# Patient Record
Sex: FEMALE | Race: WHITE | NOT HISPANIC OR LATINO | Employment: FULL TIME | URBAN - METROPOLITAN AREA
[De-identification: names, ages, dates, MRNs, and addresses within clinical notes are randomized per-mention and may not be internally consistent; named-entity substitution may affect disease eponyms.]

---

## 2017-07-25 ENCOUNTER — GENERIC CONVERSION - ENCOUNTER (OUTPATIENT)
Dept: OTHER | Facility: OTHER | Age: 62
End: 2017-07-25

## 2017-07-25 DIAGNOSIS — Z12.11 ENCOUNTER FOR SCREENING FOR MALIGNANT NEOPLASM OF COLON: ICD-10-CM

## 2017-07-25 DIAGNOSIS — I07.1 RHEUMATIC TRICUSPID INSUFFICIENCY: ICD-10-CM

## 2017-07-25 DIAGNOSIS — I34.0 NONRHEUMATIC MITRAL VALVE INSUFFICIENCY: ICD-10-CM

## 2017-07-25 DIAGNOSIS — R73.01 IMPAIRED FASTING GLUCOSE: ICD-10-CM

## 2017-07-25 DIAGNOSIS — E03.8 OTHER SPECIFIED HYPOTHYROIDISM: ICD-10-CM

## 2017-07-25 DIAGNOSIS — Z00.00 ENCOUNTER FOR GENERAL ADULT MEDICAL EXAMINATION WITHOUT ABNORMAL FINDINGS: ICD-10-CM

## 2017-07-25 DIAGNOSIS — Z92.89 PERSONAL HISTORY OF OTHER MEDICAL TREATMENT: ICD-10-CM

## 2017-07-25 DIAGNOSIS — I37.1 NONRHEUMATIC PULMONARY VALVE INSUFFICIENCY: ICD-10-CM

## 2017-07-25 DIAGNOSIS — E55.9 VITAMIN D DEFICIENCY: ICD-10-CM

## 2017-07-25 DIAGNOSIS — E66.3 OVERWEIGHT(278.02): ICD-10-CM

## 2017-10-21 ENCOUNTER — LAB CONVERSION - ENCOUNTER (OUTPATIENT)
Dept: OTHER | Facility: OTHER | Age: 62
End: 2017-10-21

## 2017-10-21 LAB
A/G RATIO (HISTORICAL): 1.5 (CALC) (ref 1–2.5)
ALBUMIN SERPL BCP-MCNC: 4 G/DL (ref 3.6–5.1)
ALP SERPL-CCNC: 76 U/L (ref 33–130)
ALT SERPL W P-5'-P-CCNC: 16 U/L (ref 6–29)
AST SERPL W P-5'-P-CCNC: 22 U/L (ref 10–35)
BASOPHILS # BLD AUTO: 1.5 %
BASOPHILS # BLD AUTO: 60 CELLS/UL (ref 0–200)
BILIRUB SERPL-MCNC: 0.5 MG/DL (ref 0.2–1.2)
BILIRUB UR QL STRIP: NEGATIVE
BUN SERPL-MCNC: 11 MG/DL (ref 7–25)
BUN/CREA RATIO (HISTORICAL): NORMAL (CALC) (ref 6–22)
CALCIUM SERPL-MCNC: 9.3 MG/DL (ref 8.6–10.4)
CHLORIDE SERPL-SCNC: 105 MMOL/L (ref 98–110)
CHOLEST SERPL-MCNC: 170 MG/DL
CHOLEST/HDLC SERPL: 2.5 (CALC)
CO2 SERPL-SCNC: 27 MMOL/L (ref 20–31)
COLOR UR: YELLOW
COMMENT (HISTORICAL): CLEAR
CREAT SERPL-MCNC: 0.63 MG/DL (ref 0.5–0.99)
DEPRECATED RDW RBC AUTO: 14.3 % (ref 11–15)
EGFR AFRICAN AMERICAN (HISTORICAL): 111 ML/MIN/1.73M2
EGFR-AMERICAN CALC (HISTORICAL): 95 ML/MIN/1.73M2
EOSINOPHIL # BLD AUTO: 108 CELLS/UL (ref 15–500)
EOSINOPHIL # BLD AUTO: 2.7 %
FECAL OCCULT BLOOD DIAGNOSTIC (HISTORICAL): NEGATIVE
GAMMA GLOBULIN (HISTORICAL): 2.7 G/DL (CALC) (ref 1.9–3.7)
GLUCOSE (HISTORICAL): 93 MG/DL (ref 65–99)
GLUCOSE (HISTORICAL): NEGATIVE
HCT VFR BLD AUTO: 35.9 % (ref 35–45)
HDLC SERPL-MCNC: 67 MG/DL
HGB BLD-MCNC: 11.6 G/DL (ref 11.7–15.5)
KETONES UR STRIP-MCNC: NEGATIVE MG/DL
LDL CHOLESTEROL (HISTORICAL): 89 MG/DL (CALC)
LEUKOCYTE ESTERASE UR QL STRIP: NEGATIVE
LYMPHOCYTES # BLD AUTO: 1424 CELLS/UL (ref 850–3900)
LYMPHOCYTES # BLD AUTO: 35.6 %
MCH RBC QN AUTO: 28.2 PG (ref 27–33)
MCHC RBC AUTO-ENTMCNC: 32.3 G/DL (ref 32–36)
MCV RBC AUTO: 87.1 FL (ref 80–100)
MONOCYTES # BLD AUTO: 456 CELLS/UL (ref 200–950)
MONOCYTES (HISTORICAL): 11.4 %
NEUTROPHILS # BLD AUTO: 1952 CELLS/UL (ref 1500–7800)
NEUTROPHILS # BLD AUTO: 48.8 %
NITRITE UR QL STRIP: NEGATIVE
NON-HDL-CHOL (CHOL-HDL) (HISTORICAL): 103 MG/DL (CALC)
PH UR STRIP.AUTO: 7.5 [PH] (ref 5–8)
PLATELET # BLD AUTO: 261 THOUSAND/UL (ref 140–400)
PMV BLD AUTO: 12.4 FL (ref 7.5–12.5)
POTASSIUM SERPL-SCNC: 4.3 MMOL/L (ref 3.5–5.3)
PROT UR STRIP-MCNC: NEGATIVE MG/DL
RBC # BLD AUTO: 4.12 MILLION/UL (ref 3.8–5.1)
SODIUM SERPL-SCNC: 139 MMOL/L (ref 135–146)
SP GR UR STRIP.AUTO: 1.01 (ref 1–1.03)
TOTAL PROTEIN (HISTORICAL): 6.7 G/DL (ref 6.1–8.1)
TRIGL SERPL-MCNC: 56 MG/DL
WBC # BLD AUTO: 4 THOUSAND/UL (ref 3.8–10.8)

## 2017-10-31 ENCOUNTER — LAB CONVERSION - ENCOUNTER (OUTPATIENT)
Dept: OTHER | Facility: OTHER | Age: 62
End: 2017-10-31

## 2017-11-06 ENCOUNTER — ALLSCRIPTS OFFICE VISIT (OUTPATIENT)
Dept: OTHER | Facility: OTHER | Age: 62
End: 2017-11-06

## 2017-11-14 ENCOUNTER — TRANSCRIBE ORDERS (OUTPATIENT)
Dept: ADMINISTRATIVE | Age: 62
End: 2017-11-14

## 2017-11-14 ENCOUNTER — HOSPITAL ENCOUNTER (OUTPATIENT)
Dept: RADIOLOGY | Age: 62
Discharge: HOME | End: 2017-11-14
Payer: COMMERCIAL

## 2017-11-14 ENCOUNTER — GENERIC CONVERSION - ENCOUNTER (OUTPATIENT)
Dept: OTHER | Facility: OTHER | Age: 62
End: 2017-11-14

## 2017-11-14 DIAGNOSIS — Z12.31 ENCOUNTER FOR SCREENING MAMMOGRAM FOR MALIGNANT NEOPLASM OF BREAST: ICD-10-CM

## 2017-11-14 PROCEDURE — G0202 SCR MAMMO BI INCL CAD: HCPCS

## 2017-12-28 ENCOUNTER — GENERIC CONVERSION - ENCOUNTER (OUTPATIENT)
Dept: INTERNAL MEDICINE CLINIC | Facility: CLINIC | Age: 62
End: 2017-12-28

## 2018-01-13 NOTE — PROGRESS NOTES
Assessment    1  Overweight (278 02) (E66 3)   2  RBBB (right bundle branch block) (426 4) (I45 10)   3  Abnormal fasting glucose (790 29) (R73 01)   4  Mild tricuspid insufficiency (397 0) (I07 1)   5  Nonrheumatic pulmonary valve insufficiency (424 3) (I37 1)   6  Non-rheumatic mitral regurgitation (424 0) (I34 0)    Plan  Abnormal fasting glucose, Colon cancer screening, Health Maintenance, Mild tricuspid  insufficiency, Overweight, RBBB (right bundle branch block)    · (1) OCCULT BLOOD, FECAL IMMUNOCHEMICAL TEST; Status:Active; Requested  for:[id number]; Abnormal fasting glucose, Health Maintenance, Mild tricuspid insufficiency, Overweight,  RBBB (right bundle branch block)    · (1) CBC/PLT/DIFF; Status:Active; Requested for:37Zvq0803;    · (1) COMPREHENSIVE METABOLIC PANEL; Status:Active; Requested for:75Kkp4575;    · (1) LIPID PANEL FASTING W DIRECT LDL REFLEX; Status:Active; Requested  for:70Rcg2944;   Adult onset hypothyroidism, Colon cancer screening    · (1) TSH; Status:Active; Requested for:60Ggv2446;   Adult onset hypothyroidism, Low vitamin D level    · (1) VITAMIN D 25-HYDROXY; Status:Active; Requested for:35Ugv1337;   Colon cancer screening    · (1) URINALYSIS (will reflex a microscopy if leukocytes, occult blood, protein or nitrites  are not within normal limits); Status:Active; Requested for:15Zdf6289; Health Maintenance, History of screening mammography    · MAMMO DIAGNOSTIC BILATERAL W CAD; Status:Hold For - Scheduling; Requested  for:59Rwz5852;   RBBB (right bundle branch block)    · ECHO STRESS TEST W CONTRAST IF INDICATED; Status:Need Information -  Financial Authorization; Requested for:[id number]; Check comprehensive blood work including vitamin D and thyroid screening  Rest echocardiogram in view of right bundle branch block  annual mammogram  Dermatology consultation for evaluation of scalp lesion  Annual pelvic examination with GYN       Discussion/Summary  In summary this 54-year-old patient presents today for annual physical examination  Since her last visit with us she has gained approximately 8 pounds  Reviewed the need to adjust her diet and increase exercise in attempt to bring her body mass index into a normal range  She has a history of a right bundle branch block along with a mitral tricuspid and pulmonic valve insufficiency by previous echocardiogram  I'm recommending that the patient have a stress echocardiogram in view of her family history of CAD  Patient's blood work is pending at this time review it with the patient when it becomes available  She has a history of mild glucose intolerance and in view of her weight gain is possible that this number may have increased  We will review the results of testing with the patient when it becomes available  I anticipate a annual physical examination appointment in one year  Chief Complaint  This is an annual complete physical examination this 54-year-old female patient  History of Present Illness  HPI: Since annual complete physical examination for this 54-year-old female patient  She presents today having gained several pounds since her last visit with us  She also is concerned about a small scalp lesion which she detected recently  She denies any active cardiac symptoms  She has a known history of glucose intolerance overweight condition right bundle branch block and mitral valve tricuspid valve and pulmonic valve regurgitation  Review of Systems    Constitutional: No fever, no chills, feels well, no tiredness, no recent weight gain or weight loss  Eyes: No complaints of eye pain, no red eyes, no eyesight problems, no discharge, no dry eyes, no itching of eyes  ENT: no complaints of earache, no loss of hearing, no nose bleeds, no nasal discharge, no sore throat, no hoarseness     Cardiovascular: No complaints of slow heart rate, no fast heart rate, no chest pain, no palpitations, no leg claudication, no lower extremity edema  Respiratory: No complaints of shortness of breath, no wheezing, no cough, no SOB on exertion, no orthopnea, no PND  Gastrointestinal: No complaints of abdominal pain, no constipation, no nausea or vomiting, no diarrhea, no bloody stools  Genitourinary: No complaints of dysuria, no incontinence, no pelvic pain, no dysmenorrhea, no vaginal discharge or bleeding  Musculoskeletal: No complaints of arthralgias, no myalgias, no joint swelling or stiffness, no limb pain or swelling  Integumentary: skin lesion and Recently discovered pigmented skin lesion on the top of the patient's head no discharge  Neurological: No complaints of headache, no confusion, no convulsions, no numbness, no dizziness or fainting, no tingling, no limb weakness, no difficulty walking  Psychiatric: Not suicidal, no sleep disturbance, no anxiety or depression, no change in personality, no emotional problems  Endocrine: No complaints of proptosis, no hot flashes, no muscle weakness, no deepening of the voice, no feelings of weakness  Hematologic/Lymphatic: No complaints of swollen glands, no swollen glands in the neck, does not bleed easily, does not bruise easily  Active Problems    1  Abnormal fasting glucose (790 29) (R73 01)   2  Adult onset hypothyroidism (244 8) (E03 8)   3  Colon cancer screening (V76 51) (Z12 11)   4  History of screening mammography (V15 89) (Z92 89)   5  Low vitamin D level (268 9) (E55 9)   6  Mild tricuspid insufficiency (397 0) (I07 1)   7  Overweight (278 02) (E66 3)   8  RBBB (right bundle branch block) (426 4) (I45 10)    Past Medical History    · History of scoliosis (V13 59) (Z87 39)    Surgical History    · History of Hysterectomy    Social History    · Caffeine use (V49 89) (F15 90)   · Exercise frequency (times/week)   · Never a smoker    Allergies    1  Demerol TABS   2   Percocet TABS    3  Seasonal    Vitals   Recorded: 21Pgd1733 09:32AM Recorded: 00Gbr0858 08:58AM Systolic 680 478   Diastolic 74 86   Heart Rate 72 80   Respiration  16   Temperature  97 8 F   O2 Saturation  96   Height  5 ft 7 in   Weight  228 lb 2 08 oz   BMI Calculated  35 73   BSA Calculated  2 14     Physical Exam    Constitutional   General appearance: No acute distress, well appearing and well nourished  Head and Face   Head and face: Normal     Eyes   Conjunctiva and lids: No swelling, erythema or discharge  Pupils and irises: Equal, round, reactive to light  Ophthalmoscopic examination: Normal fundi and optic discs  Ears, Nose, Mouth, and Throat   External inspection of ears and nose: Normal     Otoscopic examination: Tympanic membranes translucent with normal light reflex  Canals patent without erythema  Nasal mucosa, septum, and turbinates: Normal without edema or erythema  Lips, teeth, and gums: Normal, good dentition  Oropharynx: Normal with no erythema, edema, exudate or lesions  Neck   Neck: Supple, symmetric, trachea midline, no masses  Thyroid: Normal, no thyromegaly  Pulmonary   Respiratory effort: No increased work of breathing or signs of respiratory distress  Percussion of chest: Normal     Auscultation of lungs: Clear to auscultation  Cardiovascular   Auscultation of heart: Normal rate and rhythm, normal S1 and S2, no murmurs  Carotid pulses: 2+ bilaterally  Pedal pulses: 2+ bilaterally  Examination of extremities for edema and/or varicosities: Normal     Abdomen   Abdomen: Non-tender, no masses  Liver and spleen: No hepatomegaly or splenomegaly  Lymphatic   Palpation of lymph nodes in neck: No lymphadenopathy  Musculoskeletal   Gait and station: Normal     Digits and nails: Normal without clubbing or cyanosis      Joints, bones, and muscles: Normal     Range of motion: Normal     Stability: Normal     Muscle strength/tone: Normal     Skin   Skin and subcutaneous tissue: Abnormal   3 mm scalp lesion which is darkly pigmented and slightly scaled  Neurologic   Cranial nerves: Cranial nerves II-XII intact  Cortical function: Normal mental status  Reflexes: 2+ and symmetric  Coordination: Normal finger to nose and heel to shin  Psychiatric   Judgment and insight: Normal     Orientation to person, place, and time: Normal     Recent and remote memory: Intact  Mood and affect: Normal        Future Appointments    Date/Time Provider Specialty Site   07/28/2017 09:00 AM WILLIAM Whitten   Internal Medicine [facility] INTERNAL MED     Signatures   Electronically signed by : WILLIAM Hall ; Jul 25 2016 10:17AM EST                       (Author)

## 2018-01-15 NOTE — PROGRESS NOTES
Assessment    1  Non-rheumatic mitral regurgitation (424 0) (I34 0)   2  Nonrheumatic pulmonary valve insufficiency (424 3) (I37 1)   3  RBBB (right bundle branch block) (426 4) (I45 10)   4  Obesity (BMI 30-39 9) (278 00) (E66 9)   5  Vertigo (780 4) (R42)   6  Peripheral edema (782 3) (R60 9)   7  Mild tricuspid insufficiency (397 0) (I07 1)   8  Mild anemia (285 9) (D64 9)    Plan  Health Maintenance    · EKG/ECG- POC; Status:Complete;   Done: 38QLM8490 08:33AM    1  Non medic mitral valve regurgitation nonrheumatic pulmonary valve insufficiency and mild tricuspid insufficiency no change in murmur intensity recommend continued clinical observation  2   Right bundle branch block no change in like her cardiogram asymptomatic will continue to monitor  3   Obesity patient's we continues to be a significant risk for her future health have recommended that she initiate regular aerobic exercise either biking walking or swimming in addition she needs to decrease her daily calorie intake into the range of  calories a day  We had a discussion about the importance of losing weight  4   Of for vertigo most likely due to the station tube congestion improved with decongestant suspect it may have been either allergy or possibly a mild viral syndrome  At this time her nose and ear examination is completely normal no further interventions necessary at this time  5   Of very mild peripheral edema at the end of the day not present on today's examination but by history with the patient  Recommend that she be careful with salt consumption elevate her feet during the day  6   Very mild anemia with normal indices and negative Hemoccult       Discussion/Summary    In summary performed a annual physical examination on this patient today and reviewed her blood work as well as electrocardiogram  She did have a brief episode of vertigo which is resolved appears that it was most likely either congestion of the a station tube due to allergies or viral syndrome  She is strongly advised to have a baseline colonoscopy she was referred to Dr Julito Munoz for a colonoscopy  We discussed the benefits of a baseline colonoscopy in the early detection of colon cancer  She is due for an annual mammogram at this time  Of the patient has a history of very mild anemia on this set of blood work with normal indices and heme- negative stool will continue to monitor her blood count in 1 year  The anemia is very mild and asymptomatic  Patient has very slight insufficiency of 3 valves which is asymptomatic murmur has not changed I will follow this clinically at present time ago believe she needs a echocardiogram this year  Will see the patient in 1 year for her next checkup sooner if she has any medical problems  total time of encounter was 45 minutes and 35 minutes was spent counseling  Chief Complaint  patient is here for a yearly physical       History of Present Illness  HM, Adult Female: The patient is being seen for a health maintenance evaluation  The last health maintenance visit was 1 year(s) ago  Social History: Household members include spouse  She is   The patient has never smoked cigarettes  She reports rare alcohol use  She has never used illicit drugs  General Health: The patient's health since the last visit is described as good  She has regular dental visits  She denies vision problems  She denies hearing loss  Immunizations status: not up to date   Patient declines flu shot  Lifestyle:  She consumes a diverse and healthy diet  She has weight concerns  She does not exercise regularly  She does not use tobacco  She denies alcohol use  She denies drug use  Screening:   HPI: This 74-year-old female patient presents today for an annual health maintenance examination along with a electrocardiogram and blood work   She has a history recently of a period of dizziness and lightheadedness of all associated with a left-sided ear congestion  This lasted for several hours  She denies any chest pain palpitations or shortness of breath associated with the episode  She indicates that she did not have any nausea or vomiting associated with the episode either  Her symptoms did improve after taking an over-the-counter decongestant  She also has occasional and p m  foot swelling which decreases and resolves overnight      Review of Systems    Constitutional: No fever, no chills, feels well, no tiredness, no recent weight gain or weight loss  Eyes: No complaints of eye pain, no red eyes, no eyesight problems, no discharge, no dry eyes, no itching of eyes  ENT: no complaints of earache, no loss of hearing, no nose bleeds, no nasal discharge, no sore throat, no hoarseness  Cardiovascular: lower extremity edema  Respiratory: No complaints of shortness of breath, no wheezing, no cough, no SOB on exertion, no orthopnea, no PND  Gastrointestinal: No complaints of abdominal pain, no constipation, no nausea or vomiting, no diarrhea, no bloody stools  Genitourinary: No complaints of dysuria, no incontinence, no pelvic pain, no dysmenorrhea, no vaginal discharge or bleeding  Musculoskeletal: No complaints of arthralgias, no myalgias, no joint swelling or stiffness, no limb pain or swelling  Integumentary: No complaints of skin rash or lesions, no itching, no skin wounds, no breast pain or lump  Neurological: No complaints of headache, no confusion, no convulsions, no numbness, no dizziness or fainting, no tingling, no limb weakness, no difficulty walking  Psychiatric: Not suicidal, no sleep disturbance, no anxiety or depression, no change in personality, no emotional problems  Endocrine: No complaints of proptosis, no hot flashes, no muscle weakness, no deepening of the voice, no feelings of weakness     Hematologic/Lymphatic: No complaints of swollen glands, no swollen glands in the neck, does not bleed easily, does not bruise easily  Active Problems    1  Colon cancer screening (V76 51) (Z12 11)   2  History of screening mammography (V15 89) (Z92 89)   3  Mild tricuspid insufficiency (397 0) (I07 1)   4  Non-rheumatic mitral regurgitation (424 0) (I34 0)   5  Nonrheumatic pulmonary valve insufficiency (424 3) (I37 1)   6  Overweight (278 02) (E66 3)   7  RBBB (right bundle branch block) (426 4) (I45 10)   8  Visit for screening mammogram (V76 12) (Z12 31)    Past Medical History    · History of Abnormal fasting glucose (790 29) (R73 01)   · History of scoliosis (V13 59) (Z87 39)    Surgical History    · History of Hysterectomy    Family History  Mother    · No pertinent family history    Social History    · Caffeine use (V49 89) (F15 90)   · Exercise frequency (times/week)   · Never a smoker    Current Meds   1  No Reported Medications Recorded    Allergies    1  Demerol TABS   2  Percocet TABS    3  Seasonal    Vitals   Recorded: 33WWX5884 08:27AM   Temperature 97 F   Heart Rate 78   Respiration 14   Systolic 894   Diastolic 76   Height 5 ft 7 in   Weight 227 lb 0 2 oz   BMI Calculated 35 56   BSA Calculated 2 13   O2 Saturation 98     Physical Exam    Constitutional   General appearance: No acute distress, well appearing and well nourished  Head and Face   Head and face: Normal     Eyes   Conjunctiva and lids: No swelling, erythema or discharge  Pupils and irises: Equal, round, reactive to light  Ophthalmoscopic examination: Normal fundi and optic discs  Ears, Nose, Mouth, and Throat   External inspection of ears and nose: Normal     Otoscopic examination: Tympanic membranes translucent with normal light reflex  Canals patent without erythema  Nasal mucosa, septum, and turbinates: Normal without edema or erythema  Lips, teeth, and gums: Normal, good dentition  Oropharynx: Normal with no erythema, edema, exudate or lesions  Neck   Neck: Supple, symmetric, trachea midline, no masses  Thyroid: Normal, no thyromegaly  Pulmonary   Respiratory effort: No increased work of breathing or signs of respiratory distress  Percussion of chest: Normal     Auscultation of lungs: Clear to auscultation  Cardiovascular   Auscultation of heart: Abnormal   1/6 systolic murmur  Carotid pulses: 2+ bilaterally  Abdominal aorta: Normal     Pedal pulses: 2+ bilaterally  Peripheral vascular exam: Normal     Examination of extremities for edema and/or varicosities: Normal     Abdomen   Abdomen: Non-tender, no masses  Liver and spleen: No hepatomegaly or splenomegaly  Lymphatic   Palpation of lymph nodes in neck: No lymphadenopathy  Musculoskeletal   Gait and station: Normal     Digits and nails: Normal without clubbing or cyanosis  Joints, bones, and muscles: Normal     Range of motion: Normal     Stability: Normal     Muscle strength/tone: Normal     Skin   Skin and subcutaneous tissue: Normal without rashes or lesions  Palpation of skin and subcutaneous tissue: Normal turgor  Neurologic   Cranial nerves: Cranial nerves II-XII intact  Cortical function: Normal mental status  Reflexes: 2+ and symmetric  Coordination: Normal finger to nose and heel to shin  Psychiatric   Judgment and insight: Normal     Orientation to person, place, and time: Normal     Recent and remote memory: Intact  Mood and affect: Normal        Results/Data  EKG/ECG- POC [id number] 08:33AM Monse Avilez     Test Name Result Flag Reference   EKG/ECG 11/06/2017       PHQ-2 Adult Depression Screening [id number] 08:32AM User, Ahs     Test Name Result Flag Reference   PHQ-2 Adult Depression Score 0     Over the last two weeks, how often have you been bothered by any of the following problems?   Little interest or pleasure in doing things: Not at all - 0  Feeling down, depressed, or hopeless: Not at all - 0   PHQ-2 Adult Depression Screening Negative       Falls Risk Assessment (Dx Z13 89 Screen for Neurologic Disorder) [id number] 08:32AM User, Ahs     Test Name Result Flag Reference   Falls Risk      No falls in the past year       Future Appointments    Date/Time Provider Specialty Site   11/08/2018 09:15 AM WILLIAM [place]   Internal Medicine [facility] INTERNAL MED     Signatures   Electronically signed by : WILLIAM Gerardo ; Nov 6 2017 12:33PM EST                       (Author)

## 2018-01-22 VITALS
DIASTOLIC BLOOD PRESSURE: 76 MMHG | HEIGHT: 67 IN | HEART RATE: 78 BPM | OXYGEN SATURATION: 98 % | RESPIRATION RATE: 14 BRPM | SYSTOLIC BLOOD PRESSURE: 122 MMHG | WEIGHT: 227.01 LBS | BODY MASS INDEX: 35.63 KG/M2 | TEMPERATURE: 97 F

## 2018-11-02 ENCOUNTER — IMMUNIZATION (OUTPATIENT)
Dept: INTERNAL MEDICINE CLINIC | Facility: CLINIC | Age: 63
End: 2018-11-02
Payer: COMMERCIAL

## 2018-11-02 DIAGNOSIS — Z23 NEED FOR INFLUENZA VACCINATION: Primary | ICD-10-CM

## 2018-11-02 PROCEDURE — 90471 IMMUNIZATION ADMIN: CPT

## 2018-11-02 PROCEDURE — 90682 RIV4 VACC RECOMBINANT DNA IM: CPT

## 2018-12-07 ENCOUNTER — CLINICAL SUPPORT (OUTPATIENT)
Dept: INTERNAL MEDICINE CLINIC | Facility: CLINIC | Age: 63
End: 2018-12-07
Payer: COMMERCIAL

## 2018-12-07 DIAGNOSIS — Z23 NEED FOR TETANUS, DIPHTHERIA, AND ACELLULAR PERTUSSIS (TDAP) VACCINE: Primary | ICD-10-CM

## 2018-12-07 PROCEDURE — 90715 TDAP VACCINE 7 YRS/> IM: CPT

## 2018-12-07 PROCEDURE — 90471 IMMUNIZATION ADMIN: CPT

## 2019-01-21 ENCOUNTER — TELEPHONE (OUTPATIENT)
Dept: INTERNAL MEDICINE CLINIC | Facility: CLINIC | Age: 64
End: 2019-01-21

## 2019-01-21 DIAGNOSIS — Z12.11 COLON CANCER SCREENING: Primary | ICD-10-CM

## 2019-01-21 DIAGNOSIS — R53.83 OTHER FATIGUE: ICD-10-CM

## 2019-01-21 DIAGNOSIS — N39.0 URINARY TRACT INFECTION WITHOUT HEMATURIA, SITE UNSPECIFIED: ICD-10-CM

## 2019-01-21 DIAGNOSIS — E78.5 HYPERLIPIDEMIA, UNSPECIFIED HYPERLIPIDEMIA TYPE: ICD-10-CM

## 2019-01-21 PROBLEM — E66.9 OBESITY (BMI 30-39.9): Status: ACTIVE | Noted: 2017-11-06

## 2019-01-21 NOTE — TELEPHONE ENCOUNTER
Patient called and reschedule appointment to February   Patient asked for blood work to be mailed to her to be done for this appointment

## 2019-03-01 ENCOUNTER — OFFICE VISIT (OUTPATIENT)
Dept: INTERNAL MEDICINE CLINIC | Facility: CLINIC | Age: 64
End: 2019-03-01
Payer: COMMERCIAL

## 2019-03-01 VITALS
HEIGHT: 67 IN | DIASTOLIC BLOOD PRESSURE: 74 MMHG | HEART RATE: 78 BPM | TEMPERATURE: 97.3 F | WEIGHT: 233.8 LBS | BODY MASS INDEX: 36.7 KG/M2 | RESPIRATION RATE: 14 BRPM | OXYGEN SATURATION: 99 % | SYSTOLIC BLOOD PRESSURE: 132 MMHG

## 2019-03-01 DIAGNOSIS — I34.0 NON-RHEUMATIC MITRAL REGURGITATION: ICD-10-CM

## 2019-03-01 DIAGNOSIS — Z12.39 BREAST CANCER SCREENING: ICD-10-CM

## 2019-03-01 DIAGNOSIS — E66.9 OBESITY (BMI 30-39.9): ICD-10-CM

## 2019-03-01 DIAGNOSIS — I07.1 MILD TRICUSPID INSUFFICIENCY: ICD-10-CM

## 2019-03-01 DIAGNOSIS — I37.1 NONRHEUMATIC PULMONARY VALVE INSUFFICIENCY: ICD-10-CM

## 2019-03-01 DIAGNOSIS — Z00.00 HEALTHCARE MAINTENANCE: Primary | ICD-10-CM

## 2019-03-01 DIAGNOSIS — I45.10 RBBB (RIGHT BUNDLE BRANCH BLOCK): ICD-10-CM

## 2019-03-01 PROCEDURE — 93000 ELECTROCARDIOGRAM COMPLETE: CPT | Performed by: INTERNAL MEDICINE

## 2019-03-01 PROCEDURE — 99396 PREV VISIT EST AGE 40-64: CPT | Performed by: INTERNAL MEDICINE

## 2019-03-01 RX ORDER — BIOTIN 1 MG
TABLET ORAL
COMMUNITY
End: 2019-09-27 | Stop reason: ALTCHOICE

## 2019-03-01 RX ORDER — LANOLIN ALCOHOL/MO/W.PET/CERES
1 CREAM (GRAM) TOPICAL 3 TIMES DAILY
COMMUNITY

## 2019-03-01 RX ORDER — RIBOFLAVIN (VITAMIN B2) 100 MG
100 TABLET ORAL DAILY
COMMUNITY

## 2019-03-01 NOTE — PROGRESS NOTES
Assessment/Plan:    Mild tricuspid insufficiency  History of mild tricuspid insufficiency asymptomatic at this time no intervention is necessary consider follow-up echocardiogram in the next 1-2 years  Non-rheumatic mitral regurgitation  Mitral valve regurgitation asymptomatic at this time no intervention necessary recommend follow-up echocardiogram in 1-2 years  Nonrheumatic pulmonary valve insufficiency  Asymptomatic pulmonary valve insufficiency asymptomatic at this time no intervention necessary recommend follow-up echocardiogram in 1-2 years  RBBB (right bundle branch block)  Persistent right bundle branch block asymptomatic recommend follow-up EKG in 1 year    Obesity (BMI 30-39  9)  Obesity with body mass index of 36 62 recommend continued efforts to lose weight  Discussed joining weight watchers as well as regular walking exercise  The benefits of weight reduction were reviewed in detail with the patient today  Diagnoses and all orders for this visit:    Healthcare maintenance  -     POCT ECG    Breast cancer screening  -     Mammo screening bilateral w cad; Future    Mild tricuspid insufficiency    Non-rheumatic mitral regurgitation    Nonrheumatic pulmonary valve insufficiency    RBBB (right bundle branch block)    Obesity (BMI 30-39  9)    Other orders  -     Cholecalciferol (VITAMIN D3) 1000 units CAPS; Vitamin D3  -     glucosamine-chondroitin 500-400 MG tablet; Take 1 tablet by mouth 3 (three) times a day  -     Ascorbic Acid (VITAMIN C) 100 MG tablet; Take 100 mg by mouth daily        Subjective:      Patient ID: Steven Platt is a 58 y o  female  This pleasant 58-year-old female patient presents today for an annual health maintenance examination and electrocardiogram and recommendations for ongoing health maintenance  The patient is present today with no new complaints  She has a history of mild valvular disease and obesity along with a right bundle branch block        The following portions of the patient's history were reviewed and updated as appropriate:   She  has a past medical history of Abnormal fasting glucose and Scoliosis  She   Patient Active Problem List    Diagnosis Date Noted    Obesity (BMI 30-39 9) 11/06/2017    Mild tricuspid insufficiency 07/25/2016    Non-rheumatic mitral regurgitation 07/25/2016    Nonrheumatic pulmonary valve insufficiency 07/25/2016    RBBB (right bundle branch block) 07/25/2016     She  has a past surgical history that includes Hysterectomy  Her family history includes No Known Problems in her mother  She  reports that she has never smoked  She has never used smokeless tobacco  Her alcohol and drug histories are not on file  Current Outpatient Medications   Medication Sig Dispense Refill    Ascorbic Acid (VITAMIN C) 100 MG tablet Take 100 mg by mouth daily      Cholecalciferol (VITAMIN D3) 1000 units CAPS Vitamin D3      glucosamine-chondroitin 500-400 MG tablet Take 1 tablet by mouth 3 (three) times a day       No current facility-administered medications for this visit       Review of Systems   All other systems reviewed and are negative  Objective:      /74   Pulse 78   Temp (!) 97 3 °F (36 3 °C)   Resp 14   Ht 5' 7" (1 702 m)   Wt 106 kg (233 lb 12 8 oz)   SpO2 99%   BMI 36 62 kg/m²          Physical Exam   Constitutional: She is oriented to person, place, and time  Vital signs are normal  She appears well-developed and well-nourished  She is cooperative  No distress  HENT:   Right Ear: Hearing, tympanic membrane, external ear and ear canal normal    Left Ear: Hearing, tympanic membrane, external ear and ear canal normal    Nose: Nose normal  No mucosal edema  Mouth/Throat: Uvula is midline, oropharynx is clear and moist and mucous membranes are normal    Eyes: Pupils are equal, round, and reactive to light  Conjunctivae and lids are normal  Right eye exhibits no discharge   Left eye exhibits no discharge  Neck: No JVD present  Carotid bruit is not present  No thyromegaly present  Cardiovascular: Normal rate, regular rhythm and intact distal pulses  Murmur heard  Grade 1/6 systolic murmur   Pulmonary/Chest: Effort normal and breath sounds normal  No stridor  No respiratory distress  She has no wheezes  She has no rales  She exhibits no tenderness  Abdominal: Soft  Normal appearance and bowel sounds are normal  She exhibits no distension and no mass  There is no tenderness  There is no rebound and no guarding  No hernia  Musculoskeletal: Normal range of motion  She exhibits no edema  Lymphadenopathy:     She has no cervical adenopathy  Neurological: She is alert and oriented to person, place, and time  She has normal reflexes  She displays normal reflexes  Skin: Skin is warm, dry and intact  She is not diaphoretic  Psychiatric: She has a normal mood and affect  Her speech is normal and behavior is normal  Judgment and thought content normal  Cognition and memory are normal    Vitals reviewed

## 2019-03-01 NOTE — ASSESSMENT & PLAN NOTE
Mitral valve regurgitation asymptomatic at this time no intervention necessary recommend follow-up echocardiogram in 1-2 years

## 2019-03-01 NOTE — ASSESSMENT & PLAN NOTE
Asymptomatic pulmonary valve insufficiency asymptomatic at this time no intervention necessary recommend follow-up echocardiogram in 1-2 years

## 2019-03-01 NOTE — ASSESSMENT & PLAN NOTE
Obesity with body mass index of 36 62 recommend continued efforts to lose weight  Discussed joining weight watchers as well as regular walking exercise  The benefits of weight reduction were reviewed in detail with the patient today

## 2019-03-01 NOTE — ASSESSMENT & PLAN NOTE
History of mild tricuspid insufficiency asymptomatic at this time no intervention is necessary consider follow-up echocardiogram in the next 1-2 years

## 2019-08-15 ENCOUNTER — TRANSCRIBE ORDERS (OUTPATIENT)
Dept: PHYSICAL THERAPY | Age: 64
End: 2019-08-15

## 2019-08-15 ENCOUNTER — EVALUATION (OUTPATIENT)
Dept: PHYSICAL THERAPY | Age: 64
End: 2019-08-15
Payer: MEDICARE

## 2019-08-15 DIAGNOSIS — M25.561 CHRONIC PAIN OF RIGHT KNEE: Primary | ICD-10-CM

## 2019-08-15 DIAGNOSIS — M25.561 RIGHT KNEE PAIN, UNSPECIFIED CHRONICITY: Primary | ICD-10-CM

## 2019-08-15 DIAGNOSIS — G89.29 CHRONIC PAIN OF RIGHT KNEE: Primary | ICD-10-CM

## 2019-08-15 PROCEDURE — 97161 PT EVAL LOW COMPLEX 20 MIN: CPT

## 2019-08-15 NOTE — LETTER
August 15, 2019    Shasta Rice MD  [facility] [address] [facility]    Patient: Sury Vargas   YOB: 1955   Date of Visit: 8/15/2019     Encounter Diagnosis     ICD-10-CM    1  Chronic pain of right knee M25 561     G89 29        Dear Dr Virginia Tucker: Thank you for your recent referral of Sury Vargas  Please review the attached evaluation summary from Sarah Moise's recent visit  Please verify that you agree with the plan of care by signing the attached order  If you have any questions or concerns, please do not hesitate to call  I sincerely appreciate the opportunity to share in the care of one of your patients and hope to have another opportunity to work with you in the near future  Sincerely,    Nolan Palomo, PT      Referring Provider:      I certify that I have read the below Plan of Care and certify the need for these services furnished under this plan of treatment while under my care  Shasta Rice MD  [facility]: [phone]          PT Evaluation     Today's date: 8/15/2019  Patient name: Sury Vargas  : [birth year]  MRN: [id number]  Referring provider: Manfred Rawls MD  Dx:   Encounter Diagnosis     ICD-10-CM    1  Chronic pain of right knee M25 561     G89 29                   Assessment  Assessment details: Patient is a 71 y o  Female reporting to PT with complaints of R knee pain  She presents with decreased quad strength, impaired PF strength, and pain with performance of weight bearing ADL's  Patient denies N/T or radicular sx's  No red flags present  Overall, patient's presentation is consistent with OA of the R knee  At this time, patient would benefit from skilled PT to address the impairments listed    Impairments: abnormal or restricted ROM, abnormal movement, activity intolerance, impaired balance, impaired physical strength, lacks appropriate home exercise program, pain with function and poor body mechanics    Symptom irritability: moderateBarriers to therapy: None  Understanding of Dx/Px/POC: good   Prognosis: good    Goals  STG's: 4 Weeks  1 ) Patient will be independent with HEP   2 ) Patient will tolerate performance of light ADL's without discomfort  3 ) Patient will tolerate walking 4-5 city blocks without discomfort  4 ) Patient will exhibit at least 4/5 RLE strength in all planes  LTG's: 8 Weeks  1 ) Patient will exhibit 5/5 RLE strength in all planes  2 ) Patient will be able to ascend/descend stairs using reciprocal pattern without difficulty  3 ) Patient will be able to return to golfing 2x/wk without difficulty or discomfort  4 ) Patient will exceed predicted FOTO score  Plan  Plan details: Patient was educated regarding the etiology and pathomechanics of their condition  They demonstrated understanding verbally  Patient was provided with an HEP  They were educated regarding repetitions, resistance, and proper technique  Patient demonstrated understanding verbally  Patient would benefit from: skilled physical therapy  Planned therapy interventions: manual therapy, therapeutic exercise, therapeutic activities, stretching, strengthening, home exercise program, flexibility, functional ROM exercises, balance and neuromuscular re-education  Frequency: 2x week  Duration in weeks: 8  Treatment plan discussed with: patient        Subjective Evaluation    History of Present Illness  Mechanism of injury: Patient is a 71 y o  Female reporting to PT with complaints of R knee pain  Onset of sx's several weeks ago following a round of golf  She was swinging awkwardly while swinging from a sand trap and began to experience some moderate discomfort  Sx's persisted for the next few days and she was seen by her orthopedist  Arminda Malagon and were ordered and a CSI was performed on 8/12  She was then referred to PT  X-rays show OA with bone spurring  Sx's include dull/achey pain  Patient denies N/T, but did experience referred pain into the lateral aspect of the lower leg  Swelling was initially present, but has since resolved  Patient reports significant improvement in sx's following CSI  Initial calf tightness, but sx's have resolved  Functional Limitations  Walking: significant discomfort with prolonged walking; unable to walk >1/2 mile  Standing: no longer any discomfort with prolonged standing since the shot; unable to prior to the shit  Sleeping: improved ability to sleep through the night since the CSI; continued discomfort  Stairs: improved since the injection, but still some moderate discomfort   Household ADL's: able to perform, but with moderate discomfort  Recreational activities: patient has continued to golf without significant difficulty    Patient had a L TKA ~20 years ago  R Knee ACL reconstruction ~13 years ago    Patient consented to manual therapy and physical examination  Demonstrated understanding verbally             Not a recurrent problem   Quality of life: good    Pain  Current pain ratin  At best pain ratin  At worst pain rating: 3  Location: R Knee  Quality: dull ache, pressure, tight, throbbing and pulling  Relieving factors: relaxation, rest, support, ice and medications  Aggravating factors: running, walking and stair climbing      Diagnostic Tests  X-ray: abnormal  Treatments  Previous treatment: injection treatment  Patient Goals  Patient goals for therapy: decreased pain, increased motion and increased strength          Objective     Palpation     Additional Palpation Details  Point tenderness noted with palpation of medial joint line, popliteal fossa, and medial portion of calf muscle     Neurological Testing     Sensation     Knee   Left Knee   Intact: light touch    Right Knee   Intact: light touch     Reflexes   Left   Patellar (L4): normal (2+)  Achilles (S1): normal (2+)    Right   Patellar (L4): normal (2+)  Achilles (S1): normal (2+)    Active Range of Motion   Left Knee   Flexion: 110 degrees   Extension: 0 degrees     Right Knee   Flexion: 125 degrees   Extension: 0 degrees     Additional Active Range of Motion Details  SLR: 90 / 90 degrees     Strength/Myotome Testing     Left Hip   Planes of Motion   Flexion: 4+  Extension: 4-  Abduction: 4    Right Hip   Planes of Motion   Flexion: 4+  Extension: 4-  Abduction: 4    Left Knee   Flexion: 4+  Extension: 5    Right Knee   Flexion: 4  Extension: 4+    Left Ankle/Foot   Dorsiflexion: 5  Plantar flexion: 4    Right Ankle/Foot   Dorsiflexion: 5  Plantar flexion: 3+    General Comments:      Knee Comments  SLS: 4 / 3 seconds             Precautions: None      Manual              R Calf Stretch             R Quad Stretch                                                        Exercise Diary  8/15  IE            Bike             Standing Gastroc Stretch             Standing Soleus Stretch             Prone Quad Stretch             Bridges             SLR x 3             TB Clamshells             SAQ             HR             Standing H' Abd/Ext             Leg Press             Hamstring Curl             Step-Ups             CC TKE                                                                                               Modalities

## 2019-08-15 NOTE — PROGRESS NOTES
PT Evaluation     Today's date: 8/15/2019  Patient name: Jd Samano  :   MRN: [id number]  Referring provider: Luis Verma MD  Dx:   Encounter Diagnosis     ICD-10-CM    1  Chronic pain of right knee M25 561     G89 29                   Assessment  Assessment details: Patient is a 71 y o  Female reporting to PT with complaints of R knee pain  She presents with decreased quad strength, impaired PF strength, and pain with performance of weight bearing ADL's  Patient denies N/T or radicular sx's  No red flags present  Overall, patient's presentation is consistent with OA of the R knee  At this time, patient would benefit from skilled PT to address the impairments listed  Impairments: abnormal or restricted ROM, abnormal movement, activity intolerance, impaired balance, impaired physical strength, lacks appropriate home exercise program, pain with function and poor body mechanics    Symptom irritability: moderateBarriers to therapy: None  Understanding of Dx/Px/POC: good   Prognosis: good    Goals  STG's: 4 Weeks  1 ) Patient will be independent with HEP   2 ) Patient will tolerate performance of light ADL's without discomfort  3 ) Patient will tolerate walking 4-5 city blocks without discomfort  4 ) Patient will exhibit at least 4/5 RLE strength in all planes  LTG's: 8 Weeks  1 ) Patient will exhibit 5/5 RLE strength in all planes  2 ) Patient will be able to ascend/descend stairs using reciprocal pattern without difficulty  3 ) Patient will be able to return to golfing 2x/wk without difficulty or discomfort  4 ) Patient will exceed predicted FOTO score  Plan  Plan details: Patient was educated regarding the etiology and pathomechanics of their condition  They demonstrated understanding verbally  Patient was provided with an HEP  They were educated regarding repetitions, resistance, and proper technique  Patient demonstrated understanding verbally       Patient would benefit from: skilled physical therapy  Planned therapy interventions: manual therapy, therapeutic exercise, therapeutic activities, stretching, strengthening, home exercise program, flexibility, functional ROM exercises, balance and neuromuscular re-education  Frequency: 2x week  Duration in weeks: 8  Treatment plan discussed with: patient        Subjective Evaluation    History of Present Illness  Mechanism of injury: Patient is a 71 y o  Female reporting to PT with complaints of R knee pain  Onset of sx's several weeks ago following a round of golf  She was swinging awkwardly while swinging from a sand trap and began to experience some moderate discomfort  Sx's persisted for the next few days and she was seen by her orthopedist  Makayla Hannah and were ordered and a CSI was performed on 8/12  She was then referred to PT  X-rays show OA with bone spurring  Sx's include dull/achey pain  Patient denies N/T, but did experience referred pain into the lateral aspect of the lower leg  Swelling was initially present, but has since resolved  Patient reports significant improvement in sx's following CSI  Initial calf tightness, but sx's have resolved  Functional Limitations  Walking: significant discomfort with prolonged walking; unable to walk >1/2 mile  Standing: no longer any discomfort with prolonged standing since the shot; unable to prior to the shit  Sleeping: improved ability to sleep through the night since the CSI; continued discomfort  Stairs: improved since the injection, but still some moderate discomfort   Household ADL's: able to perform, but with moderate discomfort  Recreational activities: patient has continued to golf without significant difficulty    Patient had a L TKA ~20 years ago  R Knee ACL reconstruction ~13 years ago    Patient consented to manual therapy and physical examination  Demonstrated understanding verbally             Not a recurrent problem   Quality of life: good    Pain  Current pain rating: 0  At best pain ratin  At worst pain rating: 3  Location: R Knee  Quality: dull ache, pressure, tight, throbbing and pulling  Relieving factors: relaxation, rest, support, ice and medications  Aggravating factors: running, walking and stair climbing      Diagnostic Tests  X-ray: abnormal  Treatments  Previous treatment: injection treatment  Patient Goals  Patient goals for therapy: decreased pain, increased motion and increased strength          Objective     Palpation     Additional Palpation Details  Point tenderness noted with palpation of medial joint line, popliteal fossa, and medial portion of calf muscle     Neurological Testing     Sensation     Knee   Left Knee   Intact: light touch    Right Knee   Intact: light touch     Reflexes   Left   Patellar (L4): normal (2+)  Achilles (S1): normal (2+)    Right   Patellar (L4): normal (2+)  Achilles (S1): normal (2+)    Active Range of Motion   Left Knee   Flexion: 110 degrees   Extension: 0 degrees     Right Knee   Flexion: 125 degrees   Extension: 0 degrees     Additional Active Range of Motion Details  SLR: 90 / 90 degrees     Strength/Myotome Testing     Left Hip   Planes of Motion   Flexion: 4+  Extension: 4-  Abduction: 4    Right Hip   Planes of Motion   Flexion: 4+  Extension: 4-  Abduction: 4    Left Knee   Flexion: 4+  Extension: 5    Right Knee   Flexion: 4  Extension: 4+    Left Ankle/Foot   Dorsiflexion: 5  Plantar flexion: 4    Right Ankle/Foot   Dorsiflexion: 5  Plantar flexion: 3+    General Comments:      Knee Comments  SLS: 4 / 3 seconds             Precautions: None      Manual              R Calf Stretch             R Quad Stretch                                                        Exercise Diary  8/15  IE            Bike             Standing Gastroc Stretch             Standing Soleus Stretch             Prone Quad Stretch             Bridges             SLR x 3             TB [facility]             SAQ             HR             Standing H' Abd/Ext             Leg Press             Hamstring Curl             Step-Ups             CC TKE                                                                                               Modalities

## 2019-08-20 ENCOUNTER — OFFICE VISIT (OUTPATIENT)
Dept: PHYSICAL THERAPY | Age: 64
End: 2019-08-20
Payer: MEDICARE

## 2019-08-20 DIAGNOSIS — M25.561 CHRONIC PAIN OF RIGHT KNEE: Primary | ICD-10-CM

## 2019-08-20 DIAGNOSIS — G89.29 CHRONIC PAIN OF RIGHT KNEE: Primary | ICD-10-CM

## 2019-08-20 PROCEDURE — 97110 THERAPEUTIC EXERCISES: CPT | Performed by: SPECIALIST/TECHNOLOGIST

## 2019-08-20 NOTE — PROGRESS NOTES
Daily Note     Today's date: 2019  Patient name: Yusef Robles  : [birth year]  MRN: [id number]  Referring provider: Aleida Villatoro MD  Dx:   Encounter Diagnosis     ICD-10-CM    1  Chronic pain of right knee M25 561     G89 29                   Subjective: Pt reports some soreness after mulching and performing yard work over the weekend  Objective: See treatment diary below    Assessment: Introduced exercise program this visit- pt describes some joint line discomfort with exercises requiring TKE  Otherwise, pt demonstrates good muscule activation and quad control with therex this visit  Tolerated treatment well  Patient demonstrated fatigue post treatment, exhibited good technique with therapeutic exercises and would benefit from continued PT    Plan: Continue per plan of care  Progress treatment as tolerated         Precautions: None      Manual              R Calf Stretch             R Quad Stretch                                                        Exercise Diary              Bike 10'            Standing Gastroc Stretch 3x30"            Standing Soleus Stretch 3x30"            Prone Quad Stretch 4x30"            Bridges 30x            SLR x 3 2x12            TB Clamshells 20x ea            SAQ 30x 2#            HR 2x12            Standing H' Abd/Ext 2x12            Leg Press nt            Hamstring Curl nt            Step-Ups 2x12            CC TKE 20# 2x12                                                                                              Modalities

## 2019-08-22 ENCOUNTER — OFFICE VISIT (OUTPATIENT)
Dept: PHYSICAL THERAPY | Age: 64
End: 2019-08-22
Payer: MEDICARE

## 2019-08-22 DIAGNOSIS — G89.29 CHRONIC PAIN OF RIGHT KNEE: Primary | ICD-10-CM

## 2019-08-22 DIAGNOSIS — M25.561 CHRONIC PAIN OF RIGHT KNEE: Primary | ICD-10-CM

## 2019-08-22 PROCEDURE — 97110 THERAPEUTIC EXERCISES: CPT | Performed by: SPECIALIST/TECHNOLOGIST

## 2019-08-22 NOTE — PROGRESS NOTES
Daily Note     Today's date: 2019  Patient name: Malcom Page  : [birth year]  MRN: [id number]  Referring provider: Roverto Machado MD  Dx:   Encounter Diagnosis     ICD-10-CM    1  Chronic pain of right knee M25 561     G89 29                   Subjective: Pt reports mild muscular soreness after 1st PT session, denies increase in R knee joint line discomfort  Objective: See treatment diary below    Assessment: Pt able to increase repetitions and resistance with therex this visit while maintaining good quad control for appropriate knee stability  Pt describes decreased R knee pain with therex the more she activates her quad/VMO during TKE  Pt given updated HEP and theraband this visit  Tolerated treatment well  Patient demonstrated fatigue post treatment, exhibited good technique with therapeutic exercises and would benefit from continued PT    Plan: Continue per plan of care  Progress treatment as tolerated         Precautions: None      Manual             R Calf Stretch             R Quad Stretch                                                        Exercise Diary             Bike 10' 10'           Standing Gastroc Stretch 3x30" 3x30"           Standing Soleus Stretch 3x30" 3x30"           Prone Quad Stretch 4x30" 4x30"           Supine hamstring stretch  4x30"           Bridges 30x 30x            SLR x 3 2x12 30x           TB Clamshells 20x ea 20x ea Blue           SAQ 30x 2# 30x 3#           HR 2x12 30x            Standing H' Abd/Ext 2x12 2# 2x12           Leg Press nt 40# 2x12 SL           Hamstring Curl nt 25# 2x12 SL           Step-Ups 2x12 2x12           CC TKE 20# 2x12 20# 30x                                                                                             Modalities

## 2019-08-27 ENCOUNTER — OFFICE VISIT (OUTPATIENT)
Dept: PHYSICAL THERAPY | Age: 64
End: 2019-08-27
Payer: MEDICARE

## 2019-08-27 DIAGNOSIS — M25.561 CHRONIC PAIN OF RIGHT KNEE: Primary | ICD-10-CM

## 2019-08-27 DIAGNOSIS — G89.29 CHRONIC PAIN OF RIGHT KNEE: Primary | ICD-10-CM

## 2019-08-27 PROCEDURE — 97110 THERAPEUTIC EXERCISES: CPT | Performed by: SPECIALIST/TECHNOLOGIST

## 2019-08-27 NOTE — PROGRESS NOTES
Daily Note     Today's date: 2019  Patient name: Yusef Robles  :   MRN: [id number]  Referring provider: Aleida Villatoro MD  Dx:   Encounter Diagnosis     ICD-10-CM    1  Chronic pain of right knee M25 561     G89 29                   Subjective: Pt reports R knee discomfort today, attributes to golfing 2 days ago  Objective: See treatment diary below    Assessment: Pt able to increase resistnace with therex this visit breaking into sets of 10 and demonstrating appropriate muscular fatigue throughout  Tolerated treatment well  Patient demonstrated fatigue post treatment, exhibited good technique with therapeutic exercises and would benefit from continued PT to improve VMO and proximal strength and stability to improve knee pain  Plan: Continue per plan of care  Progress treatment as tolerated         Precautions: None      Manual            R Calf Stretch             R Quad Stretch                                                        Exercise Diary            Bike 10' 10' 10'          Standing Gastroc Stretch 3x30" 3x30" 3x30"          Standing Soleus Stretch 3x30" 3x30" 3x30"          Prone Quad Stretch 4x30" 4x30" 4x30"          Supine hamstring stretch  4x30" 4x30"          Bridges 30x 30x  30x          SLR x 3 2x12 30x 30x 1 5#          TB Clamshells 20x ea 20x ea Blue RTB SS 3x          SAQ 30x 2# 30x 3# 30x 4#          HR 2x12 30x  30x          Standing H' Abd/Ext 2x12 2# 2x12 3# 30x          Leg Press nt 40# 2x12 SL 40# 30x SL          Hamstring Curl nt 25# 2x12 SL 25# 20x SL          Cybex knee ext   15# 20x          Step-Ups 2x12 2x12 20x           CC TKE 20# 2x12 20# 30x 25# 30x                                                                                            Modalities

## 2019-08-29 ENCOUNTER — APPOINTMENT (OUTPATIENT)
Dept: PHYSICAL THERAPY | Age: 64
End: 2019-08-29
Payer: MEDICARE

## 2019-09-03 ENCOUNTER — APPOINTMENT (OUTPATIENT)
Dept: PHYSICAL THERAPY | Age: 64
End: 2019-09-03
Payer: MEDICARE

## 2019-09-03 ENCOUNTER — OFFICE VISIT (OUTPATIENT)
Dept: PHYSICAL THERAPY | Age: 64
End: 2019-09-03
Payer: MEDICARE

## 2019-09-03 DIAGNOSIS — G89.29 CHRONIC PAIN OF RIGHT KNEE: Primary | ICD-10-CM

## 2019-09-03 DIAGNOSIS — M25.561 CHRONIC PAIN OF RIGHT KNEE: Primary | ICD-10-CM

## 2019-09-03 PROCEDURE — 97110 THERAPEUTIC EXERCISES: CPT

## 2019-09-05 ENCOUNTER — OFFICE VISIT (OUTPATIENT)
Dept: PHYSICAL THERAPY | Age: 64
End: 2019-09-05
Payer: MEDICARE

## 2019-09-05 DIAGNOSIS — M25.561 CHRONIC PAIN OF RIGHT KNEE: Primary | ICD-10-CM

## 2019-09-05 DIAGNOSIS — G89.29 CHRONIC PAIN OF RIGHT KNEE: Primary | ICD-10-CM

## 2019-09-05 PROCEDURE — 97110 THERAPEUTIC EXERCISES: CPT | Performed by: SPECIALIST/TECHNOLOGIST

## 2019-09-05 NOTE — PROGRESS NOTES
Daily Note     Today's date: 2019  Patient name: Caridad Barrios  : [birth year]  MRN: [id number]  Referring provider: Jorge Castañeda MD  Dx:   Encounter Diagnosis     ICD-10-CM    1  Chronic pain of right knee M25 561     G89 29                   Subjective: Pt reports decreased R knee soreness after PT and golf on consecutive days, pt reports this is progress from last week  Objective: See treatment diary below    Assessment: No exercise progressions this visit due to 3 consecutive days of activity  Pt demonstrates appropriate muscular fatigue post-treatment to improve strength and stability of R knee  Tolerated treatment well  Patient demonstrated fatigue post treatment, exhibited good technique with therapeutic exercises and would benefit from continued PT    Plan: Continue per plan of care  Progress treatment as tolerated         Precautions: None      Manual            R Calf Stretch             R Quad Stretch                                                        Exercise Diary  8/20 8/22 8/27 9/3 9/5        Bike 10' 10' 10' 10' 5'        Standing Gastroc Stretch 3x30" 3x30" 3x30" 3x30" 3x30"        Standing Soleus Stretch 3x30" 3x30" 3x30" 3x30"   3x30"        Prone Quad Stretch 4x30" 4x30" 4x30" 4x30" 4x30"        Supine hamstring stretch  4x30" 4x30" 4x30" 4x30"        Bridges 30x 30x  30x 30x  30x        SLR x 3 2x12 30x 30x 1 5# 1 5#  30x 30x 1 5#        TB Clamshells 20x ea 20x ea Blue RTB SS 3x RTB RTB        SAQ 30x 2# 30x 3# 30x 4# 4#  30x 4# 30x        HR 2x12 30x  30x 30x 30x        Standing H' Abd/Ext 2x12 2# 2x12 3# 30x 3#  30x 3# 30x        Leg Press nt 40# 2x12 SL 40# 30x SL 40#  30x 40# 30x SL        Hamstring Curl nt 25# 2x12 SL 25# 20x SL 25#  20x SL 25# 20x SL        Cybex knee ext   15# 20x 15#  20x   15# 20x        Step-Ups 2x12 2x12 20x  20x 20x        CC TKE 20# 2x12 20# 30x 25# 30x 25#  30x 30# 30x        Hip Abductor    40#  30x 40# 30x Modalities

## 2019-09-09 ENCOUNTER — APPOINTMENT (OUTPATIENT)
Dept: PHYSICAL THERAPY | Age: 64
End: 2019-09-09
Payer: MEDICARE

## 2019-09-10 ENCOUNTER — APPOINTMENT (OUTPATIENT)
Dept: PHYSICAL THERAPY | Age: 64
End: 2019-09-10
Payer: MEDICARE

## 2019-09-12 ENCOUNTER — OFFICE VISIT (OUTPATIENT)
Dept: PHYSICAL THERAPY | Age: 64
End: 2019-09-12
Payer: MEDICARE

## 2019-09-12 DIAGNOSIS — G89.29 CHRONIC PAIN OF RIGHT KNEE: Primary | ICD-10-CM

## 2019-09-12 DIAGNOSIS — M25.561 CHRONIC PAIN OF RIGHT KNEE: Primary | ICD-10-CM

## 2019-09-12 PROCEDURE — 97110 THERAPEUTIC EXERCISES: CPT | Performed by: SPECIALIST/TECHNOLOGIST

## 2019-09-12 NOTE — PROGRESS NOTES
Daily Note     Today's date: 2019  Patient name: Telma Cuadra  :   MRN: [id number]  Referring provider: Alvarado Boyd MD  Dx:   Encounter Diagnosis     ICD-10-CM    1  Chronic pain of right knee M25 561     G89 29                   Subjective: Pt reports intermittent R knee pain that is related to increased activity which usually lasts ~24 hours after  Otherwise pt reports improved flexibility, strength and stability of R knee with decreased pain overall  Objective: See treatment diary below    Assessment: Pt's FOTO scores demonstrate improved function of R knee consistent with predicted values  Pt's strength has improved consistently since start of PT with overall decreased pain  Discussed in length with pt discharge plan, pt plans to sign up for gym membership to continue strengthening independently after she returns from vacation in ~1 week to maintain progress achieved thus far  Pt has scheduled follow up with referring orthopedic physician to discuss further POC if pain persists or worsens  Tolerated treatment well  Pt is appropriate to discharge given competency in exercise application  Plan: Pt to call in ~1 week when she returns from vacation to discuss need for continuation of PT after exercising independently  Recommendation to continue to perform HEP and sign up for gym membership in preparation for transition to discharge to HEP independently        Precautions: None      Manual            R Calf Stretch             R Quad Stretch                                                        Exercise Diary  8/20 8/22 8/27 9/3 9/5 9/12       Bike 10' 10' 10' 10' 5' 8'       Standing Gastroc Stretch 3x30" 3x30" 3x30" 3x30" 3x30" 3x30"       Standing Soleus Stretch 3x30" 3x30" 3x30" 3x30"   3x30" 3x30"       Prone Quad Stretch 4x30" 4x30" 4x30" 4x30" 4x30" 4x30"       Supine hamstring stretch  4x30" 4x30" 4x30" 4x30" 4x30"       Bridges 30x 30x  30x 30x  30x 30x SLR x 3 2x12 30x 30x 1 5# 1 5#  30x 30x 1 5# 30x 2#       TB Clamshells 20x ea 20x ea Blue RTB SS 3x RTB RTB RTB 3x       SAQ 30x 2# 30x 3# 30x 4# 4#  30x 4# 30x 6# 30x       HR 2x12 30x  30x 30x 30x 30x       Standing H' Abd/Ext 2x12 2# 2x12 3# 30x 3#  30x 3# 30x 4# 30x       Leg Press nt 40# 2x12 SL 40# 30x SL 40#  30x 40# 30x SL 45# 30x SL       Hamstring Curl nt 25# 2x12 SL 25# 20x SL 25#  20x SL 25# 20x SL 25# 30x SL       Cybex knee ext   15# 20x 15#  20x   15# 20x 20# 2x10       Step-Ups 2x12 2x12 20x  20x 20x 8" 30x       CC TKE 20# 2x12 20# 30x 25# 30x 25#  30x 30# 30x 30# 30x       Hip Abductor    40#  30x 40# 30x 45# 30x                                                                            Modalities

## 2019-09-23 ENCOUNTER — TELEPHONE (OUTPATIENT)
Dept: INTERNAL MEDICINE CLINIC | Facility: CLINIC | Age: 64
End: 2019-09-23

## 2019-09-23 NOTE — TELEPHONE ENCOUNTER
Pt scheduled appt for today with Dr Verdugo at 3pm for swelling of her left leg  Pt thinks that she may have Cellulitis  Pt would like to know if she should wait to come in to see doctor at 3 or go to the ED? Please call back at [phone]  Thank you!

## 2019-09-23 NOTE — TELEPHONE ENCOUNTER
Please call the patient back if she has fever and chills she should go to the emergency room for evaluation otherwise I will be happy to see her at 3 o'clock this afternoon thank you

## 2019-09-27 ENCOUNTER — OFFICE VISIT (OUTPATIENT)
Dept: INTERNAL MEDICINE CLINIC | Facility: CLINIC | Age: 64
End: 2019-09-27
Payer: MEDICARE

## 2019-09-27 VITALS
DIASTOLIC BLOOD PRESSURE: 78 MMHG | BODY MASS INDEX: 34 KG/M2 | HEART RATE: 88 BPM | RESPIRATION RATE: 14 BRPM | HEIGHT: 67 IN | WEIGHT: 216.6 LBS | SYSTOLIC BLOOD PRESSURE: 128 MMHG | OXYGEN SATURATION: 98 % | TEMPERATURE: 98.5 F

## 2019-09-27 DIAGNOSIS — Z13.1 SCREENING FOR DIABETES MELLITUS: ICD-10-CM

## 2019-09-27 DIAGNOSIS — Z12.11 SCREENING FOR COLON CANCER: ICD-10-CM

## 2019-09-27 DIAGNOSIS — M72.2 PLANTAR FASCIITIS: ICD-10-CM

## 2019-09-27 DIAGNOSIS — Z12.11 COLON CANCER SCREENING: ICD-10-CM

## 2019-09-27 DIAGNOSIS — L03.818 CELLULITIS OF OTHER SPECIFIED SITE: ICD-10-CM

## 2019-09-27 DIAGNOSIS — Z13.220 SCREENING FOR HYPERLIPIDEMIA: ICD-10-CM

## 2019-09-27 DIAGNOSIS — Z12.39 BREAST CANCER SCREENING: Primary | ICD-10-CM

## 2019-09-27 PROBLEM — L03.90 CELLULITIS: Status: ACTIVE | Noted: 2019-09-27

## 2019-09-27 PROCEDURE — 99213 OFFICE O/P EST LOW 20 MIN: CPT | Performed by: INTERNAL MEDICINE

## 2019-09-27 RX ORDER — ACETAMINOPHEN 160 MG
TABLET,DISINTEGRATING ORAL
COMMUNITY

## 2019-09-27 RX ORDER — TRIAMCINOLONE ACETONIDE 40 MG/ML
1 INJECTION, SUSPENSION INTRA-ARTICULAR; INTRAMUSCULAR
COMMUNITY
End: 2020-08-20

## 2019-09-27 RX ORDER — AMOXICILLIN 500 MG/1
CAPSULE ORAL
COMMUNITY
End: 2019-09-27 | Stop reason: ALTCHOICE

## 2019-09-27 RX ORDER — CEPHALEXIN 500 MG/1
500 CAPSULE ORAL EVERY 6 HOURS SCHEDULED
Qty: 12 CAPSULE | Refills: 0 | Status: SHIPPED | OUTPATIENT
Start: 2019-09-27 | End: 2019-09-30

## 2019-09-27 RX ORDER — CHLORHEXIDINE GLUCONATE 0.12 MG/ML
RINSE ORAL
COMMUNITY
End: 2019-09-27 | Stop reason: ALTCHOICE

## 2019-09-27 RX ORDER — CEPHALEXIN 500 MG/1
500 CAPSULE ORAL 4 TIMES DAILY
COMMUNITY
Start: 2019-09-23 | End: 2019-09-27 | Stop reason: SDUPTHER

## 2019-09-27 NOTE — ASSESSMENT & PLAN NOTE
Plantar fasciitis of the left foot recommend use of night splint to keep patient's foot in a stretch position for the plantar tissue  She is also instructed to use her orthotics at all times and not walk in bare feet

## 2019-09-27 NOTE — PROGRESS NOTES
Assessment/Plan:    Plantar fasciitis  Plantar fasciitis of the left foot recommend use of night splint to keep patient's foot in a stretch position for the plantar tissue  She is also instructed to use her orthotics at all times and not walk in bare feet  Cellulitis  Cellulitis of the left foot dorsum region in the area the metatarsal bones  Evidence of improvement according to the patient since initiating antibiotic therapy several days ago  Favor a 10 day course of antibiotics and she was provided with a prescription for 3 additional days to make at 10 day and course complete  She has no fevers or chills and only slight looseness of her stool since starting the Keflex  If symptoms rebound which she completes the antibiotic therapy she is encouraged to return for a reassessment  Diagnoses and all orders for this visit:    Breast cancer screening  -     Mammo screening bilateral w cad; Future  -     cephalexin (KEFLEX) 500 mg capsule; Take 1 capsule (500 mg total) by mouth every 6 (six) hours for 3 days    Colon cancer screening    Screening for hyperlipidemia  -     Lipid panel; Future    Screening for colon cancer  -     Occult Blood, Fecal Immunochemical; Future    Screening for diabetes mellitus  -     Basic metabolic panel; Future    Plantar fasciitis    Cellulitis of other specified site    Other orders  -     Cancel: Cologuard; Future  -     Discontinue: amoxicillin (AMOXIL) 500 mg capsule; amoxicillin 500 mg capsule   TAKE 4 CAPSULES BY MOUTH ONE HOUR PRIOR TO APPOINTMENT  -     LIDOCAINE EX; 4 mL  -     triamcinolone acetonide (KENALOG-40) 40 mg/mL; 1 mL  -     Cholecalciferol (VITAMIN D3) 2000 units capsule; Vitamin D3  -     Discontinue: cephalexin (KEFLEX) 500 mg capsule;  Take 500 mg by mouth 4 (four) times a day  -     Discontinue: chlorhexidine (PERIDEX) 0 12 % solution; chlorhexidine gluconate 0 12 % mouthwash   RINSE MOUTH WITH 1/2 OUNCE ( ONE CAPFUL) FOR 60 SECONDS AND SPIT OUT TWICE DAILY, PREFERABLY AFTER BREAKFAST AND BEFORE BED        Subjective:      Patient ID: Jere Fabian is a 71 y o  female  This 39-year-old female patient presents today for a post emergency room evaluation and the initiation of treatment for left foot cellulitis  The patient had swelling redness and erythema on the dorsum of the foot in the metatarsal region  She also had symptoms of fatigue as well as fevers and chills  She was evaluated in the emergency room  Her evaluation included multiple blood test which were reviewed with the patient today  She was started on Keflex 500 mg 4 times a day  She reports that since starting this medication the irritation in her foot and her fevers have improved  The redness is also decreased  In a separate issue identified today is a plantar fasciitis irritation of also in the left foot  The patient does have a history of plantar fasciitis in the past       The following portions of the patient's history were reviewed and updated as appropriate:   She  has a past medical history of Abnormal fasting glucose and Scoliosis  She   Patient Active Problem List    Diagnosis Date Noted    Cellulitis 09/27/2019    Plantar fasciitis 09/27/2019    Obesity (BMI 30-39 9) 11/06/2017    Mild tricuspid insufficiency 07/25/2016    Non-rheumatic mitral regurgitation 07/25/2016    Nonrheumatic pulmonary valve insufficiency 07/25/2016    RBBB (right bundle branch block) 07/25/2016     She  has a past surgical history that includes Hysterectomy  Her family history includes No Known Problems in her mother  She  reports that she has never smoked  She has never used smokeless tobacco  Her alcohol and drug histories are not on file    Current Outpatient Medications   Medication Sig Dispense Refill    Ascorbic Acid (VITAMIN C) 100 MG tablet Take 100 mg by mouth daily      cephalexin (KEFLEX) 500 mg capsule Take 1 capsule (500 mg total) by mouth every 6 (six) hours for 3 days 12 capsule 0    Cholecalciferol (VITAMIN D3) 2000 units capsule Vitamin D3      glucosamine-chondroitin 500-400 MG tablet Take 1 tablet by mouth 3 (three) times a day      LIDOCAINE EX 4 mL      triamcinolone acetonide (KENALOG-40) 40 mg/mL 1 mL       No current facility-administered medications for this visit       Review of Systems   Constitutional: Positive for fatigue and fever  Musculoskeletal:        Left foot pain   All other systems reviewed and are negative  Objective:      /78   Pulse 88   Temp 98 5 °F (36 9 °C)   Resp 14   Ht 5' 7" (1 702 m)   Wt 98 2 kg (216 lb 9 6 oz)   SpO2 98%   BMI 33 92 kg/m²          Physical Exam   Constitutional: She is oriented to person, place, and time  Vital signs are normal  She appears well-developed and well-nourished  She is cooperative  HENT:   Right Ear: Hearing, tympanic membrane, external ear and ear canal normal    Left Ear: Hearing, tympanic membrane, external ear and ear canal normal    Nose: Nose normal  No mucosal edema  Mouth/Throat: Uvula is midline, oropharynx is clear and moist and mucous membranes are normal    Eyes: Pupils are equal, round, and reactive to light  Conjunctivae and lids are normal    Neck: No JVD present  Carotid bruit is not present  No thyromegaly present  Cardiovascular: Normal rate, regular rhythm, normal heart sounds and intact distal pulses  No murmur heard  Pulmonary/Chest: Effort normal and breath sounds normal  No respiratory distress  Abdominal: Soft  Normal appearance and bowel sounds are normal    Musculoskeletal: Normal range of motion  She exhibits no edema  Left foot plantar fascial tissue tenderness   Lymphadenopathy:     She has no cervical adenopathy  Neurological: She is alert and oriented to person, place, and time  She has normal reflexes  She displays normal reflexes  Skin: Skin is warm, dry and intact     Mild erythema and swelling of the left dorsum of the metatarsal region of the foot slight increase in temperature compared to surrounding tissue   Psychiatric: She has a normal mood and affect  Her speech is normal and behavior is normal  Judgment and thought content normal  Cognition and memory are normal    Vitals reviewed  BMI Counseling: Body mass index is 33 92 kg/m²  The BMI is above normal  Nutrition recommendations include reducing portion sizes, decreasing soda and/or juice intake, moderation in carbohydrate intake, increasing intake of lean protein, reducing intake of saturated fat and trans fat and reducing intake of cholesterol  Exercise recommendations include exercising 3-5 times per week

## 2019-09-27 NOTE — ASSESSMENT & PLAN NOTE
Cellulitis of the left foot dorsum region in the area the metatarsal bones  Evidence of improvement according to the patient since initiating antibiotic therapy several days ago  Favor a 10 day course of antibiotics and she was provided with a prescription for 3 additional days to make at 10 day and course complete  She has no fevers or chills and only slight looseness of her stool since starting the Keflex  If symptoms rebound which she completes the antibiotic therapy she is encouraged to return for a reassessment

## 2020-01-22 PROBLEM — Z00.00 ENCOUNTER FOR PREVENTIVE HEALTH EXAMINATION: Status: ACTIVE | Noted: 2020-01-22

## 2020-01-26 DIAGNOSIS — M25.561 PAIN IN RIGHT KNEE: ICD-10-CM

## 2020-01-27 ENCOUNTER — APPOINTMENT (OUTPATIENT)
Dept: ORTHOPEDIC SURGERY | Facility: CLINIC | Age: 65
End: 2020-01-27
Payer: MEDICARE

## 2020-01-27 VITALS — HEIGHT: 67 IN | BODY MASS INDEX: 17.27 KG/M2 | WEIGHT: 110 LBS

## 2020-01-27 VITALS
WEIGHT: 210 LBS | SYSTOLIC BLOOD PRESSURE: 136 MMHG | HEART RATE: 78 BPM | BODY MASS INDEX: 32.89 KG/M2 | DIASTOLIC BLOOD PRESSURE: 79 MMHG

## 2020-01-27 DIAGNOSIS — Z82.61 FAMILY HISTORY OF ARTHRITIS: ICD-10-CM

## 2020-01-27 DIAGNOSIS — Z87.39 PERSONAL HISTORY OF OTHER DISEASES OF THE MUSCULOSKELETAL SYSTEM AND CONNECTIVE TISSUE: ICD-10-CM

## 2020-01-27 DIAGNOSIS — Z80.9 FAMILY HISTORY OF MALIGNANT NEOPLASM, UNSPECIFIED: ICD-10-CM

## 2020-01-27 DIAGNOSIS — S83.519A SPRAIN OF ANTERIOR CRUCIATE LIGAMENT OF UNSPECIFIED KNEE, INITIAL ENCOUNTER: ICD-10-CM

## 2020-01-27 DIAGNOSIS — Z72.89 OTHER PROBLEMS RELATED TO LIFESTYLE: ICD-10-CM

## 2020-01-27 PROCEDURE — 99204 OFFICE O/P NEW MOD 45 MIN: CPT

## 2020-01-27 PROCEDURE — 73564 X-RAY EXAM KNEE 4 OR MORE: CPT | Mod: RT

## 2020-01-27 PROCEDURE — 73562 X-RAY EXAM OF KNEE 3: CPT | Mod: LT

## 2020-01-27 NOTE — PHYSICAL EXAM
[de-identified] : General appearance: well nourished and hydrated, pleasant, alert and oriented x 3, cooperative,\par HEENT: Normocephalic, EOM intact, Nasal septum midline, Oral cavity clear, External auditory canal clear.\par Cardiovascular: no apparent abnormalities, no lower leg edema, no varicosities, pedal pulses are palpable.\par Lymphatics Lymph nodes: none palpated, Lymphedema: not present.\par Neurologic: sensation is normal, no muscle weakness in upper or lower extremities, patella tendon reflexes intact .\par Dermatologic no apparent skin lesions, moist, warm, no rash.\par Spine: cervical spine appears normal and moves freely, thoracic spine appears normal and moves freely, lumbar lordosis. \par Gait: nonantalgic.\par \par Left knee\par Inspection: no effusion or erythema.\par Wounds: curvy linear anterior incision, lateral incision, \par Alignment: normal.\par Palpation: no specific tenderness on palpation.\par ROM active (in degrees): 0-115\par Ligamentous laxity: negative ant. drawer test, negative post. drawer test, stable to varus stress test, stable to valgus stress test. \par Patellofemoral Alignment Test: Q angle-, normal.\par Muscle Test: good quad strength.\par Leg examination: calf is soft and non-tender.\par \par Right knee\par Inspection: no effusion or erythema.\par Wounds: healed medial incision, healed arthroscopic portals \par Alignment: normal.\par Palpation: medial tibial tenderness on palpation.\par ROM active (in degrees): 0-125 with crepitus and discomfort through the arc of motion \par Ligamentous laxity: positive ant. drawer test, negative post. drawer test, stable to varus stress test, stable to valgus stress test. negative Lachman's test, negative pivot shift test\par Meniscal Test: negative McMurrays, negative Maria Eugenia.\par Patellofemoral Alignment Test: Q angle-, normal.\par Muscle Test: good quad strength.\par Leg examination: calf is soft and non-tender.\par \par Left hip\par Inspection: No swelling or ecchymosis.\par Wounds: none.\par Palpation: non-tender.\par Stability: no instability.\par Strength: 5/5 all motor groups.\par ROM: no pain with FROM.\par Leg length: equal.\par \par Right hip\par Inspection: No swelling or ecchymosis.\par Wounds: none.\par Palpation: non-tender.\par Stability: no instability.\par Strength: 5/5 all motor groups.\par ROM: no pain with FROM.\par Leg length: equal.\par \par Left ankle\par Inspection: no erythema noted, no swelling noted.\par Palpation: no pain on palpation .\par ROM: FROM without crepitus.\par Muscle strength: 5/5.\par Stability: no instability noted.\par \par Right ankle\par Inspection: no erythema noted, no swelling noted.\par ROM: FROM without crepitus.\par Palpation: tenderness to palpation to anterior tibialis tendon.\par Muscle strength: 5/5.\par Stability: no instability noted.\par \par Left foot\par Inspection: color, texture and turgor are normal.\par ROM: full range of motion of all joints without pain or crepitus.\par Palpation: no tenderness.\par Stability: no instability noted.\par \par Right foot\par Inspection: color, texture and turgor are normal.\par ROM: full range of motion of all joints without pain or crepitus.\par Palpation: no tenderness.\par Stability: no instability noted.\par \par Left shoulder\par Inspection: no muscle asymmetry, no atrophy.\par Palpation: no tenderness noted, ACJ non-tender.\par ROM: full active ROM, full passive ROM.\par Strength testing): anterior deltoid, supraspinatus, infraspinatus, subscapularis all 5/5.\par Stability test: ant. apprehension negative, post. apprehension negative, relocation test negative.\par Impingement Test: negative NEER.\par \par Right shoulder\par Inspection: no muscle asymmetry, no atrophy.\par Palpation: no tenderness noted, ACJ non-tender.\par ROM: full active ROM, full passive ROM.\par Strength testing): anterior deltoid, supraspinatus, infraspinatus, subscapularis all 5/5.\par Stability test: ant. apprehension negative, post. apprehension negative, relocation test negative.\par Impingement Test: negative NEER.\par Surgical Wounds: none.\par \par Left elbow\par Inspection: negative swelling.\par Wounds: none.\par Palpation: non-tender.\par ROM: full ROM.\par Strength: 5/5 all groups.\par Stability: no instability.\par Mass: none.\par \par Right elbow\par Inspection: negative swelling.\par Wounds: none.\par Palpation: non-tender.\par ROM: full ROM.\par Strength: 5/5 all groups.\par Stability: no instability.\par Mass: none.\par \par Left wrist\par Inspection: negative swelling.\par Wound: none.\par Palpation (bone): no tenderness.\par ROM: full ROM.\par Strength: full , good.\par \par Right wrist\par Inspection: negative swelling.\par Wound: none.\par Palpation (bone): no tenderness.\par ROM: full ROM.\par Strength: full , good.\par \par Left hand\par Inspection: no skin changes, normal appearance.\par Wounds: none.\par Strength: full , able to make full fist.\par Sensation: light touch intact all fingers and thumb.\par Vascular: good capillary refill < 3 seconds, all fingers and thumb.\par Mass: none.\par \par Right hand\par Inspection: no skin changes, normal appearance. \par Wounds: none.\par Palpation: non-tender throughout.\par Strength: full , able to make full fist.\par Sensation: light touch intact all fingers and thumb.\par Vascular: good capillary refill < 3 seconds, all fingers and thumb.\par Mass: none.  [de-identified] : Left knee xrays,  AP, lateral, merchant view, taken at the office today demonstrates a total knee replacement in satisfactory position and alignment. No evidence of loosening. The unsurfaced patella sits in a central position\par  \par Right knee xrays, standing AP/Lateral, Merchant, and 45 degree PA standing view, taken at the office today shows diffuse tricompartmental degenerative arthritis, medial joint space narrowing, sclerosis, bone on bone, marginal osteophytes, patella sits at an appropriate height in a central position with joint space narrowing and osteophyte formation, Kellgren and Roney grade 4, evidence of ACL reconstruction, femoral tibial screws noted.\par \par MRI right knee taken 01/10/2020: Films brought in and reviewed, see attached report. I agree with the radiologist findings consistent with post traumatic degenerative arthritis, prior ACL reconstruction, graft torn.

## 2020-01-27 NOTE — ADDENDUM
[FreeTextEntry1] : This note was written by Petrona Rooney on 01/27/2020 acting as scribe for Dr. Lionel Duong M.D.\par \par I, Dr. Lionel Duong M.D., have read and attest that all the information, medical decision making and discharge instructions within are true and accurate.

## 2020-01-27 NOTE — DISCUSSION/SUMMARY
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 Due to the pain, failure of prior nonoperative treatment including injections, NSAIDs, and physiotherapy, and associated disability I recommend a right total knee replacement. The risks, benefits, convalescence and alternatives were reviewed. Numerous questions were asked and answered. Models were used as an educational tool.We did discuss implant choice and fixation, with shared decision making with the patient. Hardware may need to be removed if in the way of the TKR. Surgery will be scheduled at a convenient time. Preop medical clearance. \par \par Patient's left TKA at 20 years is doing well and patient is overall happy.

## 2020-01-27 NOTE — HISTORY OF PRESENT ILLNESS
[de-identified] : 64 year old female presents for initial evaluation of bilateral knee pain R>L. Patient reports her left TKA was done by Dr. Duong in 1999 and has no major issues with it, but wants to check to make sure everything is okay with xrays. Patient states her right knee has been bothering her since 6 months ago; no injury/trauma. She states she recalls falling on it once in 2018, but was more focused on her foot injury at that time, which was a fracture to her 5th metatarsal. While golfing 6 months ago in July 2019, she started having symptoms, but reports no injuries at that time. Patient states the pain is diffuse, worse with weight bearing activities, stairs and states pain is worse at night that is sharp or dull with some swelling. Patient reports mild buckling, clicking and loss of motion, but no locking sensation. She is able to walk, but takes the stairs one at a time using the handrail. Currently, she takes Aleve or Tylenol as needed and ices her knees. Patient reports a right ACL reconstruction in 2006 by Dr. Jorge. Patient had therapy last year in August and a cortisone shot  done. She states the therapy and cortisone helped mildly with her symptoms, but does not use anti-inflammatory medications often. Today, she would like to discuss her treatment options with Dr. Duong, as well as consider anthony. Patient had an MRI done in January 2020 to help make a decision about further treatment options.

## 2020-05-12 ENCOUNTER — APPOINTMENT (OUTPATIENT)
Dept: ORTHOPEDIC SURGERY | Facility: HOSPITAL | Age: 65
End: 2020-05-12

## 2020-05-27 ENCOUNTER — TRANSCRIBE ORDERS (OUTPATIENT)
Dept: ADMINISTRATIVE | Age: 65
End: 2020-05-27

## 2020-05-27 ENCOUNTER — APPOINTMENT (OUTPATIENT)
Dept: LAB | Age: 65
End: 2020-05-27
Payer: MEDICARE

## 2020-05-27 DIAGNOSIS — Z13.220 SCREENING FOR HYPERLIPIDEMIA: ICD-10-CM

## 2020-05-27 DIAGNOSIS — Z12.11 SCREENING FOR COLON CANCER: ICD-10-CM

## 2020-05-27 DIAGNOSIS — Z13.1 SCREENING FOR DIABETES MELLITUS: ICD-10-CM

## 2020-05-27 LAB
ANION GAP SERPL CALCULATED.3IONS-SCNC: 6 MMOL/L (ref 4–13)
BUN SERPL-MCNC: 10 MG/DL (ref 5–25)
CALCIUM SERPL-MCNC: 9.5 MG/DL (ref 8.3–10.1)
CHLORIDE SERPL-SCNC: 108 MMOL/L (ref 100–108)
CHOLEST SERPL-MCNC: 208 MG/DL (ref 50–200)
CO2 SERPL-SCNC: 26 MMOL/L (ref 21–32)
CREAT SERPL-MCNC: 0.58 MG/DL (ref 0.6–1.3)
GFR SERPL CREATININE-BSD FRML MDRD: 97 ML/MIN/1.73SQ M
GLUCOSE P FAST SERPL-MCNC: 95 MG/DL (ref 65–99)
HDLC SERPL-MCNC: 81 MG/DL
HEMOCCULT STL QL IA: NEGATIVE
LDLC SERPL CALC-MCNC: 115 MG/DL (ref 0–100)
NONHDLC SERPL-MCNC: 127 MG/DL
POTASSIUM SERPL-SCNC: 3.8 MMOL/L (ref 3.5–5.3)
SODIUM SERPL-SCNC: 140 MMOL/L (ref 136–145)
TRIGL SERPL-MCNC: 59 MG/DL

## 2020-05-27 PROCEDURE — 36415 COLL VENOUS BLD VENIPUNCTURE: CPT

## 2020-05-27 PROCEDURE — 80061 LIPID PANEL: CPT

## 2020-05-27 PROCEDURE — 80048 BASIC METABOLIC PNL TOTAL CA: CPT

## 2020-05-27 PROCEDURE — G0328 FECAL BLOOD SCRN IMMUNOASSAY: HCPCS

## 2020-06-01 ENCOUNTER — OFFICE VISIT (OUTPATIENT)
Dept: INTERNAL MEDICINE CLINIC | Facility: CLINIC | Age: 65
End: 2020-06-01
Payer: MEDICARE

## 2020-06-01 VITALS
TEMPERATURE: 98.1 F | HEART RATE: 82 BPM | SYSTOLIC BLOOD PRESSURE: 130 MMHG | BODY MASS INDEX: 33.19 KG/M2 | DIASTOLIC BLOOD PRESSURE: 78 MMHG | OXYGEN SATURATION: 98 % | HEIGHT: 67 IN | WEIGHT: 211.5 LBS

## 2020-06-01 DIAGNOSIS — M17.10 ARTHRITIS OF KNEE: Primary | ICD-10-CM

## 2020-06-01 DIAGNOSIS — E78.49 OTHER HYPERLIPIDEMIA: ICD-10-CM

## 2020-06-01 DIAGNOSIS — I34.0 NON-RHEUMATIC MITRAL REGURGITATION: ICD-10-CM

## 2020-06-01 DIAGNOSIS — I37.1 NONRHEUMATIC PULMONARY VALVE INSUFFICIENCY: ICD-10-CM

## 2020-06-01 DIAGNOSIS — I07.1 MILD TRICUSPID INSUFFICIENCY: ICD-10-CM

## 2020-06-01 DIAGNOSIS — E66.9 OBESITY (BMI 30-39.9): ICD-10-CM

## 2020-06-01 PROBLEM — L03.90 CELLULITIS: Status: RESOLVED | Noted: 2019-09-27 | Resolved: 2020-06-01

## 2020-06-01 PROBLEM — M72.2 PLANTAR FASCIITIS: Status: RESOLVED | Noted: 2019-09-27 | Resolved: 2020-06-01

## 2020-06-01 PROCEDURE — 3008F BODY MASS INDEX DOCD: CPT | Performed by: INTERNAL MEDICINE

## 2020-06-01 PROCEDURE — G0402 INITIAL PREVENTIVE EXAM: HCPCS | Performed by: INTERNAL MEDICINE

## 2020-06-01 RX ORDER — LIDOCAINE HYDROCHLORIDE 20 MG/ML
4 INJECTION, SOLUTION EPIDURAL; INFILTRATION; INTRACAUDAL; PERINEURAL
COMMUNITY
End: 2020-06-01 | Stop reason: SDUPTHER

## 2020-07-06 ENCOUNTER — APPOINTMENT (OUTPATIENT)
Dept: ORTHOPEDIC SURGERY | Facility: CLINIC | Age: 65
End: 2020-07-06
Payer: MEDICARE

## 2020-07-06 DIAGNOSIS — Z96.652 PRESENCE OF LEFT ARTIFICIAL KNEE JOINT: ICD-10-CM

## 2020-07-06 PROCEDURE — 73562 X-RAY EXAM OF KNEE 3: CPT | Mod: LT

## 2020-07-06 PROCEDURE — 73564 X-RAY EXAM KNEE 4 OR MORE: CPT | Mod: RT

## 2020-07-06 PROCEDURE — 99214 OFFICE O/P EST MOD 30 MIN: CPT

## 2020-07-06 RX ORDER — AMOXICILLIN 500 MG/1
500 CAPSULE ORAL
Qty: 4 | Refills: 0 | Status: ACTIVE | COMMUNITY
Start: 2020-06-22

## 2020-07-06 NOTE — HISTORY OF PRESENT ILLNESS
[de-identified] : 64 year old female presents for follow up evaluation of right knee pain. Her left knee is s/p TKR about 21 years ago, which she states is doing fine. Her right knee is symptomatic. She underwent nonoperative treatment, including gel shots done in January by a doctor near her home. She also had a lot of PT in January and March before COVID-19. She states she has a lot of pain in her knee and is on the schedule for a TKR on September 8, but is thinking of moving it earlier depending on Dr. Duong's schedule. Patient is putting more knee on the left TKR and is worried she will make her TKR worse. She is taking Aleve BID with help. She is also on the schedule to see her cardiologist and would like to proceed with the surgery.

## 2020-07-06 NOTE — DISCUSSION/SUMMARY
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 Due to the pain, failure of prior nonoperative treatment including injections, NSAIDs, and physiotherapy, and associated disability I recommend a right total knee replacement. The risks, benefits, convalescence and alternatives were reviewed. Numerous questions were asked and answered. Models were used as an educational tool.We did discuss implant choice and fixation, with shared decision making with the patient. Hardware in the tibia may need to be removed if in the way of the TKR. Surgery is scheduled for September 8, 2020. Preop medical and cardiac clearance. Her left TKR is doing well and she is overall very happy.

## 2020-07-06 NOTE — ADDENDUM
[FreeTextEntry1] : This note was written by Petrona Rooney on 07/06/2020 acting as scribe for Dr. Lionel Duong M.D.\par \par I, Dr. Lionel Duong M.D., have read and attest that all the information, medical decision making and discharge instructions within are true and accurate.

## 2020-07-06 NOTE — PHYSICAL EXAM
[de-identified] : Left knee\par Inspection: no effusion or erythema.\par Wounds: curvy linear anterior incision, lateral incision, \par Alignment: normal.\par Palpation: no specific tenderness on palpation.\par ROM active (in degrees): 0-115\par Ligamentous laxity: negative ant. drawer test, negative post. drawer test, stable to varus stress test, stable to valgus stress test. \par Patellofemoral Alignment Test: Q angle-, normal.\par Muscle Test: good quad strength.\par Leg examination: calf is soft and non-tender.\par \par Right knee\par Inspection: no effusion or erythema.\par Wounds: healed medial incision, healed arthroscopic portals \par Alignment: normal.\par Palpation: medial tibial tenderness on palpation.\par ROM active (in degrees): 0-125 with crepitus and discomfort through the arc of motion \par Ligamentous laxity: positive ant. drawer test, negative post. drawer test, stable to varus stress test, stable to valgus stress test. negative Lachman's test, negative pivot shift test\par Meniscal Test: negative McMurrays, negative Maria Eugenia.\par Patellofemoral Alignment Test: Q angle-, normal.\par Muscle Test: good quad strength.\par Leg examination: calf is soft and non-tender. [de-identified] : Left knee xrays, AP, lateral, merchant view, taken at the office today demonstrates a total knee replacement in satisfactory position and alignment. No evidence of loosening. The unsurfaced patella sits in a central position\par  \par Right knee xrays, standing AP/Lateral, Merchant, and 45 degree PA standing view, taken at the office today shows diffuse tricompartmental degenerative arthritis, medial joint space narrowing, sclerosis, bone on bone, marginal osteophytes, patella sits at an appropriate height in a central position with joint space narrowing and osteophyte formation, Kellgren and Roney grade 4, evidence of ACL reconstruction, femoral tibial screws noted.

## 2020-07-24 ENCOUNTER — HOSPITAL ENCOUNTER (OUTPATIENT)
Dept: NON INVASIVE DIAGNOSTICS | Facility: CLINIC | Age: 65
Discharge: HOME | End: 2020-07-24
Payer: MEDICARE

## 2020-07-24 DIAGNOSIS — I34.0 NON-RHEUMATIC MITRAL REGURGITATION: ICD-10-CM

## 2020-07-24 PROCEDURE — 93306 TTE W/DOPPLER COMPLETE: CPT

## 2020-07-24 PROCEDURE — 93306 TTE W/DOPPLER COMPLETE: CPT | Performed by: INTERNAL MEDICINE

## 2020-08-07 ENCOUNTER — TELEPHONE (OUTPATIENT)
Dept: INTERNAL MEDICINE CLINIC | Facility: CLINIC | Age: 65
End: 2020-08-07

## 2020-08-07 NOTE — TELEPHONE ENCOUNTER
Pt would appreciate a call back to review Echo results from 7/24/20  She can be reached back at [phone]      Thank you

## 2020-08-07 NOTE — PROGRESS NOTES
Called and discuss the results of her echo which was essentially normal   No evidence of valvular disease

## 2020-08-17 NOTE — TELEPHONE ENCOUNTER
Call returned to the patient no answer I left a message on her voice box will try to contact her tomorrow

## 2020-08-19 NOTE — PROGRESS NOTES
Outpatient Consultation - General Cardiology   Ofelia Fuentes 76 y o  female   MRN: [id number]  Encounter: [id number]      Consults    PCP: Hortensia Zhang MD      Risk factors:  -HLD  -Class 2 obesity    History of Present Illness   Physician Requesting Consult: No att  providers found  Reason for Consult / Principal Problem: Preoperative risk stratification    HPI: Ofelia Fuentes is a 76y o  year old female with a history of dyslipidemia, hemodynamically non-significant mitral insufficiency, class 2 obesity, complicated by right knee osteoarthritis who presented to [facility] outpatient Cardiology office as a referral from orthopedic surgeon for right knee surgery preoperative risk stratification  Patient successfully underwent left total knee arthroplasty in 1997 and is awaiting right total knee replacement with Dr Jemma Sevilla of [address] [place] [address] orthopedic surgery  On interview, patient endorses being pretty active at home  She is able to go up and down the stairs with limitations due to her knee pain but not from a cardiac standpoint  She does the stationary bike, pool exercises and house chores without any issues  She denies any chest pain, palpitations, head mass, dizziness, diaphoresis, nausea, vomiting, orthopnea, PND or lower extremity edema  She sleeps with 2 pillows due to sinus issues and not breathing difficulty  She tolerated her left knee replacement in 1997 and her right ACL reconstruction in 2006 without any cardiac issues  She endorses a family history of atrial fibrillation in her father and necessity for pacemaker in her late mother  There is no family history of sudden cardiac death, early onset heart disease or defibrillator implantation  She has a remote sporadic tobacco use in her teenage years but nothing since  Review of Systems  ROS as noted above         Historical Information   Past Medical History:   Diagnosis Date    Abnormal fasting glucose  Scoliosis      Past Surgical History:   Procedure Laterality Date    HYSTERECTOMY       Social History     Substance and Sexual Activity   Alcohol Use Not on file     Social History     Substance and Sexual Activity   Drug Use Not on file     Social History     Tobacco Use   Smoking Status Never Smoker   Smokeless Tobacco Never Used     Family History:   Family History   Problem Relation Age of Onset    No Known Problems Mother        Meds/Allergies   Home Medications:   Current Outpatient Medications:     Ascorbic Acid (VITAMIN C) 100 MG tablet, Take 100 mg by mouth daily, Disp: , Rfl:     Cholecalciferol (VITAMIN D3) 2000 units capsule, Vitamin D3, Disp: , Rfl:     glucosamine-chondroitin 500-400 MG tablet, Take 1 tablet by mouth 3 (three) times a day, Disp: , Rfl:     LIDOCAINE EX, 4 mL, Disp: , Rfl:     triamcinolone acetonide (KENALOG-40) 40 mg/mL, 1 mL, Disp: , Rfl:     Allergies   Allergen Reactions    Meperidine     Oxycodone-Acetaminophen     Pollen Extract          Objective   Vitals: There were no vitals taken for this visit  Physical Exam    GEN: Sharyn Witt appears well, alert and oriented x 3, pleasant and cooperative   HEENT:  Normocephalic, atraumatic, anicteric, moist mucous membranes  NECK: No JVD or carotid bruits   HEART: Regular rhythm, normal rate, normal S1 and S2, no murmurs, clicks, gallops or rubs   LUNGS: Clear to auscultation bilaterally; no wheezes, rales, or rhonchi; respiration nonlabored   ABDOMEN:  Normoactive bowel sounds, soft, no tenderness, no distention  EXTREMITIES: peripheral pulses palpable; no edema  NEURO: no gross focal findings; cranial nerves grossly intact   SKIN:  Dry, lower extremity varicosities, warm to touch    Lab Results: I have personally reviewed pertinent lab results      No results found for: CKTOTAL, CKMB, CKMBINDEX, TROPONINI  Lab Results   Component Value Date    CHOL 170 10/20/2017    CHOL 183 07/09/2015    HDL 81 05/27/2020 HDL 67 10/20/2017    LDLCALC 115 (H) 2020    LDLCALC 96 2015    TRIG 59 2020    TRIG 56 10/20/2017       Lab Results   Component Value Date    GLUCOSE 105 2015    CALCIUM 9 5 2020     10/20/2017    K 3 8 2020    CO2 26 2020     2020    BUN 10 2020    CREATININE 0 58 (L) 2020     Lab Results   Component Value Date    ALT 16 10/20/2017    AST 22 10/20/2017       Lab Results   Component Value Date    WBC 4 0 10/20/2017    HGB 11 6 (L) 10/20/2017    HCT 35 9 10/20/2017    MCV 87 1 10/20/2017     10/20/2017         Imaging: I have personally reviewed pertinent reports  and I have personally reviewed pertinent films in PACS    CATH:  None to be reviewed  STRESS TEST:  None to be reviewed  EKG:   Date: 2020 in office  Interpretation:  Normal sinus rhythm, right bundle branch block, HR 86,  ms    ECHO:   Results for orders placed during the hospital encounter of 20   Echo complete with contrast if indicated    Narrative [address]  [facility], [address] [place] [address]  [phone]    Transthoracic Echocardiogram  2D, M-mode, Doppler, and Color Doppler    Study date:  2020    Patient: Shannan Franco  MR number: [id number]  Account number: [de-identified]  : 1955  Age: 71 years  Gender: Female  Status: Outpatient  Location: [address] [place] [address] [facility]  Height: 67 in  Weight: 210 5 lb  BP: 130/ 78 mmHg    Indications: Mitral regurgitation  Diagnoses: 424 0 - MITRAL VALVE DISORDER    Sonographer:  DENISHA Mcdowell  Interpreting Physician:  Lucina Nance MD  Primary Physician:  Javier Villafuerte MD  Referring Physician:  Javier Villafuerte MD  Group:  [facility]  Cardiology Fellow:  Karyn Caraballo DO    SUMMARY    LEFT VENTRICLE:  Systolic function was normal  Ejection fraction was estimated to be 60 %    There were no regional wall motion abnormalities  RIGHT VENTRICLE:  The size was normal   Systolic function was normal     TRICUSPID VALVE:  There was mild regurgitation  Pulmonary artery systolic pressure was within the normal range  HISTORY: PRIOR HISTORY: RBBB, TR, MR, HLD, "MVP "    PROCEDURE: The study was performed in the [address]  This was a routine study  The transthoracic approach was used  The study included complete 2D imaging, M-mode, complete spectral Doppler, and color Doppler  The  heart rate was 65 bpm, at the start of the study  Image quality was adequate  LEFT VENTRICLE: Size was normal  Systolic function was normal  Ejection fraction was estimated to be 60 %  There were no regional wall motion abnormalities  DOPPLER: Left ventricular diastolic function parameters were normal for the  patient's age  RIGHT VENTRICLE: The size was normal  Systolic function was normal  Wall thickness was normal     LEFT ATRIUM: Size was normal     RIGHT ATRIUM: Size was normal     MITRAL VALVE: Valve structure was normal  There was normal leaflet separation  DOPPLER: The transmitral velocity was within the normal range  There was no evidence for stenosis  There was no regurgitation  AORTIC VALVE: The valve was trileaflet  Leaflets exhibited normal thickness and normal cuspal separation  DOPPLER: Transaortic velocity was within the normal range  There was no evidence for stenosis  There was no regurgitation  TRICUSPID VALVE: The valve structure was normal  There was normal leaflet separation  DOPPLER: The transtricuspid velocity was within the normal range  There was no evidence for stenosis  There was mild regurgitation  Pulmonary artery  systolic pressure was within the normal range  Estimated peak PA pressure was 25 mmHg  PULMONIC VALVE: Leaflets exhibited normal thickness, no calcification, and normal cuspal separation  DOPPLER: The transpulmonic velocity was within the normal range   There was trace regurgitation  PERICARDIUM: There was no pericardial effusion  AORTA: The root exhibited normal size  SYSTEM MEASUREMENT TABLES    2D  %FS: 26 62 %  Ao Diam: 3 28 cm  EDV(Teich): 114 06 ml  EF(Cube): 60 49 %  EF(Teich): 51 88 %  ESV(Cube): 47 14 ml  ESV(Teich): 54 89 ml  IVSd: 1 cm  LA Area: 17 31 cm2  LA Diam: 3 6 cm  LVEDV MOD A4C: 155 2 ml  LVEF MOD A4C: 64 29 %  LVESV MOD A4C: 55 42 ml  LVIDd: 4 92 cm  LVIDs: 3 61 cm  LVLd A4C: 8 48 cm  LVLs A4C: 6 86 cm  LVPWd: 0 8 cm  RA Area: 15 69 cm2  RV Diam : 4 05 cm  SI(Cube): 34 87 ml/m2  SI(Teich): 28 59 ml/m2  SV MOD A4C: 99 78 ml  SV(Cube): 72 19 ml  SV(Teich): 59 18 ml    CW  AV Env  Ti: 325 32 ms  AV VTI: 28 11 cm  AV Vmax: 1 44 m/s  AV Vmean: 0 87 m/s  AV maxP 34 mmHg  AV meanPG: 3 58 mmHg  TR Vmax: 2 34 m/s  TR maxP 85 mmHg    MM  TAPSE: 2 17 cm    PW  E': 0 08 m/s  E/E': 8 45  HR: 64 15 BPM  LVOT Env  Ti: 325 32 ms  LVOT VTI: 23 5 cm  LVOT Vmax: 1 1 m/s  LVOT Vmean: 0 72 m/s  LVOT maxP 87 mmHg  LVOT meanP 44 mmHg  MV A Gallo: 0 72 m/s  MV Dec [place]: 2 6 m/s2  MV DecT: 244 11 ms  MV E Gallo: 0 63 m/s  MV E/A Ratio: 0 88    [employer] Accredited Echocardiography Laboratory    Prepared and electronically signed by    Tristian Jones MD  Signed 2020 11:13:47           Assessment/Plan     Assessment:    1  Preoperative risk stratification  -RCRI 0 pt (Class I risk)  -Webber 0 2%  -exertional tolerance, 4+ METs  -in office ECG:  Normal sinus rhythm with right bundle-branch block consistent since     2  Dyslipidemia  -2020 lipids: total 208, TG 59, HDL 81, calc   -BMI 33 1  -diet controlled    3  Mild tricuspid insufficiency  -2020 TTE: normal RV size and function, estimated PASP 25 mmHg  -hemodynamically non-significant, asymptomatic patient      Plan:  1  Based on the information available, patient is a low risk candidate for a low to intermediate risk procedure    This was explained to the patient who expressed understanding  She wishes to proceed with orthopedic intervention as her knee discomfort is lifestyle limiting  2  There is no indication for further cardiac intervention  3  Perioperative medication management as per primary care physician and/or surgical team     4  Patient to follow up Cardiology only on an as-needed basis if any changes in her clinical status  Remainder of care as per primary physician  Case discussed and reviewed with Dr Michele Bullock who agrees with my assessment and plan  Thank you for involving us in the care of your patient

## 2020-08-20 ENCOUNTER — OFFICE VISIT (OUTPATIENT)
Dept: CARDIOLOGY CLINIC | Facility: CLINIC | Age: 65
End: 2020-08-20
Payer: MEDICARE

## 2020-08-20 VITALS
OXYGEN SATURATION: 96 % | WEIGHT: 210.6 LBS | BODY MASS INDEX: 33.06 KG/M2 | SYSTOLIC BLOOD PRESSURE: 144 MMHG | HEART RATE: 86 BPM | TEMPERATURE: 97.2 F | DIASTOLIC BLOOD PRESSURE: 80 MMHG | HEIGHT: 67 IN

## 2020-08-20 DIAGNOSIS — Z01.810 PREOP CARDIOVASCULAR EXAM: Primary | ICD-10-CM

## 2020-08-20 DIAGNOSIS — Z01.818 PREOPERATIVE TESTING: Primary | ICD-10-CM

## 2020-08-20 DIAGNOSIS — Z01.818 PREOP TESTING: ICD-10-CM

## 2020-08-20 PROCEDURE — 1160F RVW MEDS BY RX/DR IN RCRD: CPT | Performed by: INTERNAL MEDICINE

## 2020-08-20 PROCEDURE — 1036F TOBACCO NON-USER: CPT | Performed by: INTERNAL MEDICINE

## 2020-08-20 PROCEDURE — 3008F BODY MASS INDEX DOCD: CPT | Performed by: INTERNAL MEDICINE

## 2020-08-20 PROCEDURE — 99204 OFFICE O/P NEW MOD 45 MIN: CPT | Performed by: INTERNAL MEDICINE

## 2020-08-20 PROCEDURE — 93000 ELECTROCARDIOGRAM COMPLETE: CPT | Performed by: INTERNAL MEDICINE

## 2020-08-20 RX ORDER — TURMERIC ROOT EXTRACT 500 MG
TABLET ORAL DAILY
COMMUNITY
End: 2022-08-04

## 2020-08-20 NOTE — LETTER
August 20, 2020     Nayely Whitman MD  [address]  [address], [address] [facility],[address]    Patient: Yon Castro   YOB: 1955   Date of Visit: 8/20/2020       Dear Dr Yahaira Mccormick:    Thank you for referring Blu Hooper to me for evaluation  Below are my notes for this consultation  If you have questions, please do not hesitate to call me  I look forward to following your patient along with you  Sincerely,        Marisa Amador MD        CC: Referral Self  Marisa Amador MD  8/20/2020  2:22 PM  Sign when Signing Visit  Outpatient Consultation - General Cardiology   Yon Castro 76 y o  female   MRN: [id number]  Encounter: [id number]      Consults    PCP: Nayely Whitman MD      Risk factors:  -HLD  -Class 2 obesity    History of Present Illness   Physician Requesting Consult: No att  providers found  Reason for Consult / Principal Problem: Preoperative risk stratification    HPI: Yon Castro is a 76y o  year old female with a history of dyslipidemia, hemodynamically non-significant mitral insufficiency, class 2 obesity, complicated by right knee osteoarthritis who presented to [facility] outpatient Cardiology office as a referral from orthopedic surgeon for right knee surgery preoperative risk stratification  Patient successfully underwent left total knee arthroplasty in 1997 and is awaiting right total knee replacement with Dr Isaac Gramajo of [address] [place] [address] orthopedic surgery  On interview, patient endorses being pretty active at home  She is able to go up and down the stairs with limitations due to her knee pain but not from a cardiac standpoint  She does the stationary bike, pool exercises and house chores without any issues  She denies any chest pain, palpitations, head mass, dizziness, diaphoresis, nausea, vomiting, orthopnea, PND or lower extremity edema    She sleeps with 2 pillows due to sinus issues and not breathing difficulty  She tolerated her left knee replacement in 1997 and her right ACL reconstruction in 2006 without any cardiac issues  She endorses a family history of atrial fibrillation in her father and necessity for pacemaker in her late mother  There is no family history of sudden cardiac death, early onset heart disease or defibrillator implantation  She has a remote sporadic tobacco use in her teenage years but nothing since  Review of Systems  ROS as noted above  Historical Information   Past Medical History:   Diagnosis Date    Abnormal fasting glucose     Scoliosis      Past Surgical History:   Procedure Laterality Date    HYSTERECTOMY       Social History     Substance and Sexual Activity   Alcohol Use Not on file     Social History     Substance and Sexual Activity   Drug Use Not on file     Social History     Tobacco Use   Smoking Status Never Smoker   Smokeless Tobacco Never Used     Family History:   Family History   Problem Relation Age of Onset    No Known Problems Mother        Meds/Allergies   Home Medications:   Current Outpatient Medications:     Ascorbic Acid (VITAMIN C) 100 MG tablet, Take 100 mg by mouth daily, Disp: , Rfl:     Cholecalciferol (VITAMIN D3) 2000 units capsule, Vitamin D3, Disp: , Rfl:     glucosamine-chondroitin 500-400 MG tablet, Take 1 tablet by mouth 3 (three) times a day, Disp: , Rfl:     LIDOCAINE EX, 4 mL, Disp: , Rfl:     triamcinolone acetonide (KENALOG-40) 40 mg/mL, 1 mL, Disp: , Rfl:     Allergies   Allergen Reactions    Meperidine     Oxycodone-Acetaminophen     Pollen Extract          Objective   Vitals: There were no vitals taken for this visit        Physical Exam    GEN: Mary Dominguez appears well, alert and oriented x 3, pleasant and cooperative   HEENT:  Normocephalic, atraumatic, anicteric, moist mucous membranes  NECK: No JVD or carotid bruits   HEART: Regular rhythm, normal rate, normal S1 and S2, no murmurs, clicks, gallops or rubs LUNGS: Clear to auscultation bilaterally; no wheezes, rales, or rhonchi; respiration nonlabored   ABDOMEN:  Normoactive bowel sounds, soft, no tenderness, no distention  EXTREMITIES: peripheral pulses palpable; no edema  NEURO: no gross focal findings; cranial nerves grossly intact   SKIN:  Dry, lower extremity varicosities, warm to touch    Lab Results: I have personally reviewed pertinent lab results  No results found for: CKTOTAL, CKMB, CKMBINDEX, TROPONINI  Lab Results   Component Value Date    CHOL 170 10/20/2017    CHOL 183 2015    HDL 81 2020    HDL 67 10/20/2017    LDLCALC 115 (H) 2020    LDLCALC 96 2015    TRIG 59 2020    TRIG 56 10/20/2017       Lab Results   Component Value Date    GLUCOSE 105 2015    CALCIUM 9 5 2020     10/20/2017    K 3 8 2020    CO2 26 2020     2020    BUN 10 2020    CREATININE 0 58 (L) 2020     Lab Results   Component Value Date    ALT 16 10/20/2017    AST 22 10/20/2017       Lab Results   Component Value Date    WBC 4 0 10/20/2017    HGB 11 6 (L) 10/20/2017    HCT 35 9 10/20/2017    MCV 87 1 10/20/2017     10/20/2017         Imaging: I have personally reviewed pertinent reports  and I have personally reviewed pertinent films in PACS    CATH:  None to be reviewed  STRESS TEST:  None to be reviewed      EKG:   Date: 2020 in office  Interpretation:  Normal sinus rhythm, right bundle branch block, HR 86,  ms    ECHO:   Results for orders placed during the hospital encounter of 20   Echo complete with contrast if indicated    N[facility]ative [facility]  [address]  [phone]    Transthoracic Echocardiogram  2D, M-mode, Doppler, and Color Doppler    Study date:  2020    Patient: Sampson Roberto  MR number: [id number]  Account number: [de-identified]  : 1955  Age: 71 years  Gender: Female  Status: Outpatient  Location: [address] [place] [address] [facility]  Height: 67 in  Weight: 210 5 lb  BP: 130/ 78 mmHg    Indications: Mitral regurgitation  Diagnoses: 424 0 - MITRAL VALVE DISORDER    Sonographer:  DENISHA Luther  Interpreting Physician:  Ramesh Payne MD  Primary Physician:  Holger Warren MD  Referring Physician:  Holger Warren MD  Group:  [facility]  Cardiology Fellow:  Sahara Wilkins DO    SUMMARY    LEFT VENTRICLE:  Systolic function was normal  Ejection fraction was estimated to be 60 %  There were no regional wall motion abnormalities  RIGHT VENTRICLE:  The size was normal   Systolic function was normal     TRICUSPID VALVE:  There was mild regurgitation  Pulmonary artery systolic pressure was within the normal range  HISTORY: PRIOR HISTORY: RBBB, TR, MR, HLD, "MVP "    PROCEDURE: The study was performed in the [address]  This was a routine study  The transthoracic approach was used  The study included complete 2D imaging, M-mode, complete spectral Doppler, and color Doppler  The  heart rate was 65 bpm, at the start of the study  Image quality was adequate  LEFT VENTRICLE: Size was normal  Systolic function was normal  Ejection fraction was estimated to be 60 %  There were no regional wall motion abnormalities  DOPPLER: Left ventricular diastolic function parameters were normal for the  patient's age  RIGHT VENTRICLE: The size was normal  Systolic function was normal  Wall thickness was normal     LEFT ATRIUM: Size was normal     RIGHT ATRIUM: Size was normal     MITRAL VALVE: Valve structure was normal  There was normal leaflet separation  DOPPLER: The transmitral velocity was within the normal range  There was no evidence for stenosis  There was no regurgitation  AORTIC VALVE: The valve was trileaflet  Leaflets exhibited normal thickness and normal cuspal separation  DOPPLER: Transaortic velocity was within the normal range  There was no evidence for stenosis  There was no regurgitation  TRICUSPID VALVE: The valve structure was normal  There was normal leaflet separation  DOPPLER: The transtricuspid velocity was within the normal range  There was no evidence for stenosis  There was mild regurgitation  Pulmonary artery  systolic pressure was within the normal range  Estimated peak PA pressure was 25 mmHg  PULMONIC VALVE: Leaflets exhibited normal thickness, no calcification, and normal cuspal separation  DOPPLER: The transpulmonic velocity was within the normal range  There was trace regurgitation  PERICARDIUM: There was no pericardial effusion  AORTA: The root exhibited normal size  SYSTEM MEASUREMENT TABLES    2D  %FS: 26 62 %  Ao Diam: 3 28 cm  EDV(Teich): 114 06 ml  EF(Cube): 60 49 %  EF(Teich): 51 88 %  ESV(Cube): 47 14 ml  ESV(Teich): 54 89 ml  IVSd: 1 cm  LA Area: 17 31 cm2  LA Diam: 3 6 cm  LVEDV MOD A4C: 155 2 ml  LVEF MOD A4C: 64 29 %  LVESV MOD A4C: 55 42 ml  LVIDd: 4 92 cm  LVIDs: 3 61 cm  LVLd A4C: 8 48 cm  LVLs A4C: 6 86 cm  LVPWd: 0 8 cm  RA Area: 15 69 cm2  RV Diam : 4 05 cm  SI(Cube): 34 87 ml/m2  SI(Teich): 28 59 ml/m2  SV MOD A4C: 99 78 ml  SV(Cube): 72 19 ml  SV(Teich): 59 18 ml    CW  AV Env  Ti: 325 32 ms  AV VTI: 28 11 cm  AV Vmax: 1 44 m/s  AV Vmean: 0 87 m/s  AV maxP 34 mmHg  AV meanPG: 3 58 mmHg  TR Vmax: 2 34 m/s  TR maxP 85 mmHg    MM  TAPSE: 2 17 cm    PW  E': 0 08 m/s  E/E': 8 45  HR: 64 15 BPM  LVOT Env  Ti: 325 32 ms  LVOT VTI: 23 5 cm  LVOT Vmax: 1 1 m/s  LVOT Vmean: 0 72 m/s  LVOT maxP 87 mmHg  LVOT meanP 44 mmHg  MV A Gallo: 0 72 m/s  MV Dec [place]: 2 6 m/s2  MV DecT: 244 11 ms  MV E Gallo: 0 63 m/s  MV E/A Ratio: 0 88    [employer] Accredited Echocardiography Laboratory    Prepared and electronically signed by    Lucina Nance MD  Signed 2020 11:13:47           Assessment/Plan     Assessment:    1   Preoperative risk stratification  -RCRI 0 pt (Class I risk)  -Webber 0 2%  -exertional tolerance, 4+ METs  -in office ECG:  Normal sinus rhythm with right bundle-branch block consistent since 2016    2  Dyslipidemia  -5/2020 lipids: total 208, TG 59, HDL 81, calc   -BMI 33 1  -diet controlled    3  Mild tricuspid insufficiency  -7/2020 TTE: normal RV size and function, estimated PASP 25 mmHg  -hemodynamically non-significant, asymptomatic patient      Plan:  1  Based on the information available, patient is a low risk candidate for a low to intermediate risk procedure  This was explained to the patient who expressed understanding  She wishes to proceed with orthopedic intervention as her knee discomfort is lifestyle limiting  2  There is no indication for further cardiac intervention  3  Perioperative medication management as per primary care physician and/or surgical team     4  Patient to follow up Cardiology only on an as-needed basis if any changes in her clinical status  Remainder of care as per primary physician  Case discussed and reviewed with Dr Justina Estes who agrees with my assessment and plan  Thank you for involving us in the care of your patient

## 2020-08-25 ENCOUNTER — OFFICE VISIT (OUTPATIENT)
Dept: INTERNAL MEDICINE CLINIC | Facility: CLINIC | Age: 65
End: 2020-08-25
Payer: MEDICARE

## 2020-08-25 VITALS
HEIGHT: 67 IN | BODY MASS INDEX: 33.34 KG/M2 | OXYGEN SATURATION: 97 % | WEIGHT: 212.4 LBS | TEMPERATURE: 98.4 F | DIASTOLIC BLOOD PRESSURE: 80 MMHG | HEART RATE: 91 BPM | SYSTOLIC BLOOD PRESSURE: 126 MMHG

## 2020-08-25 DIAGNOSIS — I45.10 RBBB (RIGHT BUNDLE BRANCH BLOCK): ICD-10-CM

## 2020-08-25 DIAGNOSIS — M17.10 ARTHRITIS OF KNEE: ICD-10-CM

## 2020-08-25 DIAGNOSIS — Z01.818 PREOP EXAM FOR INTERNAL MEDICINE: Primary | ICD-10-CM

## 2020-08-25 DIAGNOSIS — Z01.818 PREOP TESTING: ICD-10-CM

## 2020-08-25 PROCEDURE — 87070 CULTURE OTHR SPECIMN AEROBIC: CPT | Performed by: INTERNAL MEDICINE

## 2020-08-25 PROCEDURE — 1036F TOBACCO NON-USER: CPT | Performed by: INTERNAL MEDICINE

## 2020-08-25 PROCEDURE — 99214 OFFICE O/P EST MOD 30 MIN: CPT | Performed by: INTERNAL MEDICINE

## 2020-08-25 PROCEDURE — 3008F BODY MASS INDEX DOCD: CPT | Performed by: INTERNAL MEDICINE

## 2020-08-25 PROCEDURE — 1160F RVW MEDS BY RX/DR IN RCRD: CPT | Performed by: INTERNAL MEDICINE

## 2020-08-25 NOTE — ASSESSMENT & PLAN NOTE
Advanced arthritis of the right knee limiting the patient's quality of life and activities  She is evaluated today on physical examination we find no issues to prevent her surgery  I have reviewed her Cardiology clearance notes and there is no indication on this consultation of issues as well  Awaiting her chest x-ray and blood work

## 2020-08-25 NOTE — ASSESSMENT & PLAN NOTE
Patient appears to be medically stable based upon today's examination and review of her electrocardiogram   Pre-admission blood work and chest x-ray are pending at the time of this visit will reviewed them and forward them to her surgeon in [place]

## 2020-08-25 NOTE — ASSESSMENT & PLAN NOTE
Stable right bundle branch block on recent EKG no recent symptoms of chest pain palpitations shortness of breath    I have reviewed the patient's preoperative clearance visit with her cardiologist

## 2020-08-25 NOTE — PROGRESS NOTES
Assessment/Plan:    RBBB (right bundle branch block)  Stable right bundle branch block on recent EKG no recent symptoms of chest pain palpitations shortness of breath  I have reviewed the patient's preoperative clearance visit with her cardiologist     Arthritis of knee  Advanced arthritis of the right knee limiting the patient's quality of life and activities  She is evaluated today on physical examination we find no issues to prevent her surgery  I have reviewed her Cardiology clearance notes and there is no indication on this consultation of issues as well  Awaiting her chest x-ray and blood work  Preop exam for internal medicine  Patient appears to be medically stable based upon today's examination and review of her electrocardiogram   Pre-admission blood work and chest x-ray are pending at the time of this visit will reviewed them and forward them to her surgeon in [place]  Diagnoses and all orders for this visit:    Preop testing  -     Sedimentation rate, automated; Future  -     UA w Reflex to Microscopic w Reflex to Culture -Lab Collect  -     Nasal culture; Future  -     Nasal culture    RBBB (right bundle branch block)    Arthritis of knee    Preop exam for internal medicine        Subjective:      Patient ID: Rodolfo Salas is a 76 y o  female  This 59-year-old female patient presents today for a preoperative medical evaluation  She will be having surgery on September 8, 2020 for a right total knee replacement operation  This surgery will be performed at [facility]     Letter from her surgeon indicates requested preoperative testing including a EKG and chest x-ray  The studies have been requested  The patient indicates that she has had no chest pain palpitations or shortness of breath to indicate any cardiac instability  She has also been seen and evaluated by [facility] cardiologist and cleared for surgery  She has had no recent colds or infections      A period of 30 minutes was spent with the patient today greater than 50% of time was spent in counseling and coordination of care  The following portions of the patient's history were reviewed and updated as appropriate:   She  has a past medical history of Abnormal fasting glucose and Scoliosis  She   Patient Active Problem List    Diagnosis Date Noted    Preop exam for internal medicine 08/25/2020    Arthritis of knee 06/01/2020    Other hyperlipidemia 06/01/2020    Obesity (BMI 30-39 9) 11/06/2017    Mild tricuspid insufficiency 07/25/2016    Non-rheumatic mitral regurgitation 07/25/2016    Nonrheumatic pulmonary valve insufficiency 07/25/2016    RBBB (right bundle branch block) 07/25/2016     She  has a past surgical history that includes Hysterectomy  Her family history includes No Known Problems in her mother  She  reports that she has never smoked  She has never used smokeless tobacco  No history on file for alcohol and drug  Current Outpatient Medications   Medication Sig Dispense Refill    Ascorbic Acid (VITAMIN C) 100 MG tablet Take 100 mg by mouth daily      Cholecalciferol (VITAMIN D3) 2000 units capsule Vitamin D3      glucosamine-chondroitin 500-400 MG tablet Take 1 tablet by mouth 3 (three) times a day      Turmeric 500 MG TABS Take by mouth daily      LIDOCAINE EX 4 mL       No current facility-administered medications for this visit       Review of Systems   Musculoskeletal: Positive for arthralgias, joint swelling and myalgias  Right knee discomfort with swelling and decreased range of motion   All other systems reviewed and are negative  Objective:      /80   Pulse 91   Temp 98 4 °F (36 9 °C)   Ht 5' 7" (1 702 m)   Wt 96 3 kg (212 lb 6 4 oz)   SpO2 97%   BMI 33 27 kg/m²          Physical Exam  Vitals signs reviewed  Constitutional:       Appearance: Normal appearance  She is well-developed     HENT:      Right Ear: Hearing, tympanic membrane, ear canal and external ear normal       Left Ear: Hearing, tympanic membrane, ear canal and external ear normal       Nose: Nose normal  No mucosal edema  Mouth/Throat:      Mouth: Mucous membranes are moist       Pharynx: Oropharynx is clear  Uvula midline  Eyes:      General: Lids are normal       Conjunctiva/sclera: Conjunctivae normal       Pupils: Pupils are equal, round, and reactive to light  Neck:      Thyroid: No thyromegaly  Vascular: No carotid bruit or JVD  Cardiovascular:      Rate and Rhythm: Normal rate and regular rhythm  Heart sounds: Normal heart sounds  No murmur  Pulmonary:      Effort: Pulmonary effort is normal  No respiratory distress  Breath sounds: Normal breath sounds  No stridor  No wheezing, rhonchi or rales  Abdominal:      General: Bowel sounds are normal  There is no distension  Palpations: Abdomen is soft  There is no mass  Tenderness: There is no abdominal tenderness  Hernia: No hernia is present  Musculoskeletal: Normal range of motion  Right lower leg: No edema  Left lower leg: No edema  Comments: Tender right knee with increased swelling of the joint   Lymphadenopathy:      Cervical: No cervical adenopathy  Skin:     General: Skin is warm and dry  Coloration: Skin is not jaundiced or pale  Neurological:      General: No focal deficit present  Mental Status: She is alert and oriented to person, place, and time  Mental status is at baseline  Deep Tendon Reflexes: Reflexes are normal and symmetric  Reflexes normal    Psychiatric:         Speech: Speech normal          Behavior: Behavior normal  Behavior is cooperative  Thought Content:  Thought content normal          Judgment: Judgment normal

## 2020-08-27 ENCOUNTER — APPOINTMENT (OUTPATIENT)
Dept: RADIOLOGY | Age: 65
End: 2020-08-27
Payer: MEDICARE

## 2020-08-27 ENCOUNTER — APPOINTMENT (OUTPATIENT)
Dept: LAB | Age: 65
End: 2020-08-27
Payer: MEDICARE

## 2020-08-27 DIAGNOSIS — Z01.818 PREOP TESTING: ICD-10-CM

## 2020-08-27 DIAGNOSIS — Z01.818 PREOPERATIVE TESTING: ICD-10-CM

## 2020-08-27 LAB
ALBUMIN SERPL BCP-MCNC: 3.9 G/DL (ref 3.5–5)
ALP SERPL-CCNC: 72 U/L (ref 46–116)
ALT SERPL W P-5'-P-CCNC: 22 U/L (ref 12–78)
ANION GAP SERPL CALCULATED.3IONS-SCNC: 6 MMOL/L (ref 4–13)
APTT PPP: 27 SECONDS (ref 23–37)
AST SERPL W P-5'-P-CCNC: 19 U/L (ref 5–45)
BACTERIA NOSE AEROBE CULT: NORMAL
BACTERIA UR QL AUTO: ABNORMAL /HPF
BASOPHILS # BLD AUTO: 0.05 THOUSANDS/ΜL (ref 0–0.1)
BASOPHILS NFR BLD AUTO: 1 % (ref 0–1)
BILIRUB SERPL-MCNC: 0.54 MG/DL (ref 0.2–1)
BILIRUB UR QL STRIP: NEGATIVE
BUN SERPL-MCNC: 11 MG/DL (ref 5–25)
CALCIUM SERPL-MCNC: 8.9 MG/DL (ref 8.3–10.1)
CHLORIDE SERPL-SCNC: 111 MMOL/L (ref 100–108)
CLARITY UR: ABNORMAL
CO2 SERPL-SCNC: 24 MMOL/L (ref 21–32)
COLOR UR: YELLOW
CREAT SERPL-MCNC: 0.6 MG/DL (ref 0.6–1.3)
EOSINOPHIL # BLD AUTO: 0.1 THOUSAND/ΜL (ref 0–0.61)
EOSINOPHIL NFR BLD AUTO: 2 % (ref 0–6)
ERYTHROCYTE [DISTWIDTH] IN BLOOD BY AUTOMATED COUNT: 15.2 % (ref 11.6–15.1)
ERYTHROCYTE [SEDIMENTATION RATE] IN BLOOD: 44 MM/HOUR (ref 0–29)
EST. AVERAGE GLUCOSE BLD GHB EST-MCNC: 120 MG/DL
GFR SERPL CREATININE-BSD FRML MDRD: 95 ML/MIN/1.73SQ M
GLUCOSE P FAST SERPL-MCNC: 108 MG/DL (ref 65–99)
GLUCOSE UR STRIP-MCNC: NEGATIVE MG/DL
HBA1C MFR BLD: 5.8 %
HCT VFR BLD AUTO: 37.3 % (ref 34.8–46.1)
HGB BLD-MCNC: 11.8 G/DL (ref 11.5–15.4)
HGB UR QL STRIP.AUTO: NEGATIVE
HYALINE CASTS #/AREA URNS LPF: ABNORMAL /LPF
IMM GRANULOCYTES # BLD AUTO: 0.01 THOUSAND/UL (ref 0–0.2)
IMM GRANULOCYTES NFR BLD AUTO: 0 % (ref 0–2)
INR PPP: 0.99 (ref 0.84–1.19)
KETONES UR STRIP-MCNC: NEGATIVE MG/DL
LEUKOCYTE ESTERASE UR QL STRIP: ABNORMAL
LYMPHOCYTES # BLD AUTO: 1.34 THOUSANDS/ΜL (ref 0.6–4.47)
LYMPHOCYTES NFR BLD AUTO: 31 % (ref 14–44)
MCH RBC QN AUTO: 28.5 PG (ref 26.8–34.3)
MCHC RBC AUTO-ENTMCNC: 31.6 G/DL (ref 31.4–37.4)
MCV RBC AUTO: 90 FL (ref 82–98)
MONOCYTES # BLD AUTO: 0.52 THOUSAND/ΜL (ref 0.17–1.22)
MONOCYTES NFR BLD AUTO: 12 % (ref 4–12)
NEUTROPHILS # BLD AUTO: 2.32 THOUSANDS/ΜL (ref 1.85–7.62)
NEUTS SEG NFR BLD AUTO: 54 % (ref 43–75)
NITRITE UR QL STRIP: NEGATIVE
NON-SQ EPI CELLS URNS QL MICRO: ABNORMAL /HPF
NRBC BLD AUTO-RTO: 0 /100 WBCS
PH UR STRIP.AUTO: 7 [PH]
PLATELET # BLD AUTO: 231 THOUSANDS/UL (ref 149–390)
PMV BLD AUTO: 11.8 FL (ref 8.9–12.7)
POTASSIUM SERPL-SCNC: 3.8 MMOL/L (ref 3.5–5.3)
PROT SERPL-MCNC: 7.3 G/DL (ref 6.4–8.2)
PROT UR STRIP-MCNC: NEGATIVE MG/DL
PROTHROMBIN TIME: 13.1 SECONDS (ref 11.6–14.5)
RBC # BLD AUTO: 4.14 MILLION/UL (ref 3.81–5.12)
RBC #/AREA URNS AUTO: ABNORMAL /HPF
SODIUM SERPL-SCNC: 141 MMOL/L (ref 136–145)
SP GR UR STRIP.AUTO: 1.01 (ref 1–1.03)
UROBILINOGEN UR QL STRIP.AUTO: 0.2 E.U./DL
WBC # BLD AUTO: 4.34 THOUSAND/UL (ref 4.31–10.16)
WBC #/AREA URNS AUTO: ABNORMAL /HPF

## 2020-08-27 PROCEDURE — 85025 COMPLETE CBC W/AUTO DIFF WBC: CPT

## 2020-08-27 PROCEDURE — 83036 HEMOGLOBIN GLYCOSYLATED A1C: CPT

## 2020-08-27 PROCEDURE — 80053 COMPREHEN METABOLIC PANEL: CPT

## 2020-08-27 PROCEDURE — 85730 THROMBOPLASTIN TIME PARTIAL: CPT

## 2020-08-27 PROCEDURE — 85610 PROTHROMBIN TIME: CPT

## 2020-08-27 PROCEDURE — 85652 RBC SED RATE AUTOMATED: CPT

## 2020-08-27 PROCEDURE — 36415 COLL VENOUS BLD VENIPUNCTURE: CPT

## 2020-08-27 PROCEDURE — 87086 URINE CULTURE/COLONY COUNT: CPT | Performed by: INTERNAL MEDICINE

## 2020-08-27 PROCEDURE — 81001 URINALYSIS AUTO W/SCOPE: CPT | Performed by: INTERNAL MEDICINE

## 2020-08-27 PROCEDURE — 71046 X-RAY EXAM CHEST 2 VIEWS: CPT

## 2020-08-28 LAB — BACTERIA UR CULT: NORMAL

## 2020-09-04 VITALS
OXYGEN SATURATION: 96 % | DIASTOLIC BLOOD PRESSURE: 83 MMHG | SYSTOLIC BLOOD PRESSURE: 148 MMHG | RESPIRATION RATE: 16 BRPM | HEART RATE: 83 BPM | TEMPERATURE: 98 F | HEIGHT: 67 IN | WEIGHT: 208.78 LBS

## 2020-09-04 NOTE — PATIENT PROFILE ADULT - VISION (WITH CORRECTIVE LENSES IF THE PATIENT USUALLY WEARS THEM):
Normal vision: sees adequately in most situations; can see medication labels, newsprint glasses, contacts/Normal vision: sees adequately in most situations; can see medication labels, newsprint

## 2020-09-05 ENCOUNTER — LABORATORY RESULT (OUTPATIENT)
Age: 65
End: 2020-09-05

## 2020-09-07 RX ORDER — MAGNESIUM HYDROXIDE 400 MG/1
30 TABLET, CHEWABLE ORAL DAILY
Refills: 0 | Status: DISCONTINUED | OUTPATIENT
Start: 2020-09-08 | End: 2020-09-09

## 2020-09-07 RX ORDER — FOLIC ACID 0.8 MG
1 TABLET ORAL DAILY
Refills: 0 | Status: DISCONTINUED | OUTPATIENT
Start: 2020-09-08 | End: 2020-09-09

## 2020-09-07 RX ORDER — HYDROMORPHONE HYDROCHLORIDE 2 MG/ML
0.5 INJECTION INTRAMUSCULAR; INTRAVENOUS; SUBCUTANEOUS
Refills: 0 | Status: DISCONTINUED | OUTPATIENT
Start: 2020-09-08 | End: 2020-09-09

## 2020-09-07 RX ORDER — SODIUM CHLORIDE 9 MG/ML
1000 INJECTION, SOLUTION INTRAVENOUS
Refills: 0 | Status: DISCONTINUED | OUTPATIENT
Start: 2020-09-08 | End: 2020-09-09

## 2020-09-07 RX ORDER — ENOXAPARIN SODIUM 100 MG/ML
40 INJECTION SUBCUTANEOUS EVERY 24 HOURS
Refills: 0 | Status: DISCONTINUED | OUTPATIENT
Start: 2020-09-09 | End: 2020-09-09

## 2020-09-07 RX ORDER — POLYETHYLENE GLYCOL 3350 17 G/17G
17 POWDER, FOR SOLUTION ORAL DAILY
Refills: 0 | Status: DISCONTINUED | OUTPATIENT
Start: 2020-09-08 | End: 2020-09-09

## 2020-09-07 RX ORDER — ACETAMINOPHEN 500 MG
975 TABLET ORAL EVERY 8 HOURS
Refills: 0 | Status: DISCONTINUED | OUTPATIENT
Start: 2020-09-08 | End: 2020-09-09

## 2020-09-07 RX ORDER — ONDANSETRON 8 MG/1
4 TABLET, FILM COATED ORAL EVERY 6 HOURS
Refills: 0 | Status: DISCONTINUED | OUTPATIENT
Start: 2020-09-08 | End: 2020-09-09

## 2020-09-07 RX ORDER — ASCORBIC ACID 60 MG
500 TABLET,CHEWABLE ORAL
Refills: 0 | Status: DISCONTINUED | OUTPATIENT
Start: 2020-09-08 | End: 2020-09-09

## 2020-09-07 RX ORDER — SENNA PLUS 8.6 MG/1
2 TABLET ORAL AT BEDTIME
Refills: 0 | Status: DISCONTINUED | OUTPATIENT
Start: 2020-09-08 | End: 2020-09-09

## 2020-09-07 NOTE — H&P ADULT - NSICDXPASTSURGICALHX_GEN_ALL_CORE_FT
PAST SURGICAL HISTORY:  Surgery, elective TKR- left    Surgery, elective right acl reconstruction    Surgery, elective partial hysterectomy

## 2020-09-07 NOTE — H&P ADULT - NSHPPHYSICALEXAM_GEN_ALL_CORE
Right knee tenderness on palpation, ROM 0-120 degrees with pain and crepitus. Calf is soft and nontender. Mild joint effusion.

## 2020-09-07 NOTE — H&P ADULT - HISTORY OF PRESENT ILLNESS
65 y.o. F with h/o right knee pain, swelling limited mobility and ambulation presents at [facility] for her Left TKR with Dr. Duong.

## 2020-09-07 NOTE — H&P ADULT - NSHPLABSRESULTS_GEN_ALL_CORE
Right knee x-rays show tricompartmental DJD, medial joint space narrowing, bone on bone, sclerosis, marginal osteophytes, patella sits in appropriate height in a central position with joint space narrowing and osteophyte formation, Kellgren and Roney grade 4.

## 2020-09-08 ENCOUNTER — APPOINTMENT (OUTPATIENT)
Dept: ORTHOPEDIC SURGERY | Facility: HOSPITAL | Age: 65
End: 2020-09-08

## 2020-09-08 ENCOUNTER — INPATIENT (INPATIENT)
Facility: HOSPITAL | Age: 65
LOS: 0 days | Discharge: HOME HEALTH | DRG: 470 | End: 2020-09-09
Attending: ORTHOPAEDIC SURGERY | Admitting: ORTHOPAEDIC SURGERY
Payer: COMMERCIAL

## 2020-09-08 DIAGNOSIS — Z88.8 ALLERGY STATUS TO OTHER DRUGS, MEDICAMENTS AND BIOLOGICAL SUBSTANCES: ICD-10-CM

## 2020-09-08 DIAGNOSIS — Z41.9 ENCOUNTER FOR PROCEDURE FOR PURPOSES OTHER THAN REMEDYING HEALTH STATE, UNSPECIFIED: Chronic | ICD-10-CM

## 2020-09-08 DIAGNOSIS — M17.11 UNILATERAL PRIMARY OSTEOARTHRITIS, RIGHT KNEE: ICD-10-CM

## 2020-09-08 DIAGNOSIS — Z98.890 OTHER SPECIFIED POSTPROCEDURAL STATES: Chronic | ICD-10-CM

## 2020-09-08 DIAGNOSIS — Z88.5 ALLERGY STATUS TO NARCOTIC AGENT: ICD-10-CM

## 2020-09-08 LAB
ANION GAP SERPL CALC-SCNC: 11 MMOL/L — SIGNIFICANT CHANGE UP (ref 5–17)
BUN SERPL-MCNC: 9 MG/DL — SIGNIFICANT CHANGE UP (ref 7–23)
CALCIUM SERPL-MCNC: 9.4 MG/DL — SIGNIFICANT CHANGE UP (ref 8.4–10.5)
CHLORIDE SERPL-SCNC: 108 MMOL/L — SIGNIFICANT CHANGE UP (ref 96–108)
CO2 SERPL-SCNC: 26 MMOL/L — SIGNIFICANT CHANGE UP (ref 22–31)
CREAT SERPL-MCNC: 0.53 MG/DL — SIGNIFICANT CHANGE UP (ref 0.5–1.3)
GLUCOSE SERPL-MCNC: 136 MG/DL — HIGH (ref 70–99)
HCT VFR BLD CALC: 37.3 % — SIGNIFICANT CHANGE UP (ref 34.5–45)
HGB BLD-MCNC: 11.7 G/DL — SIGNIFICANT CHANGE UP (ref 11.5–15.5)
MCHC RBC-ENTMCNC: 28.3 PG — SIGNIFICANT CHANGE UP (ref 27–34)
MCHC RBC-ENTMCNC: 31.4 GM/DL — LOW (ref 32–36)
MCV RBC AUTO: 90.1 FL — SIGNIFICANT CHANGE UP (ref 80–100)
NRBC # BLD: 0 /100 WBCS — SIGNIFICANT CHANGE UP (ref 0–0)
PLATELET # BLD AUTO: 222 K/UL — SIGNIFICANT CHANGE UP (ref 150–400)
POTASSIUM SERPL-MCNC: 3.7 MMOL/L — SIGNIFICANT CHANGE UP (ref 3.5–5.3)
POTASSIUM SERPL-SCNC: 3.7 MMOL/L — SIGNIFICANT CHANGE UP (ref 3.5–5.3)
RBC # BLD: 4.14 M/UL — SIGNIFICANT CHANGE UP (ref 3.8–5.2)
RBC # FLD: 15.2 % — HIGH (ref 10.3–14.5)
SODIUM SERPL-SCNC: 145 MMOL/L — SIGNIFICANT CHANGE UP (ref 135–145)
WBC # BLD: 5.75 K/UL — SIGNIFICANT CHANGE UP (ref 3.8–10.5)
WBC # FLD AUTO: 5.75 K/UL — SIGNIFICANT CHANGE UP (ref 3.8–10.5)

## 2020-09-08 PROCEDURE — 73560 X-RAY EXAM OF KNEE 1 OR 2: CPT | Mod: 26,RT

## 2020-09-08 PROCEDURE — 27447 TOTAL KNEE ARTHROPLASTY: CPT | Mod: RT

## 2020-09-08 RX ORDER — HYDROMORPHONE HYDROCHLORIDE 2 MG/ML
0.5 INJECTION INTRAMUSCULAR; INTRAVENOUS; SUBCUTANEOUS EVERY 4 HOURS
Refills: 0 | Status: DISCONTINUED | OUTPATIENT
Start: 2020-09-08 | End: 2020-09-08

## 2020-09-08 RX ORDER — HYDROMORPHONE HYDROCHLORIDE 2 MG/ML
4 INJECTION INTRAMUSCULAR; INTRAVENOUS; SUBCUTANEOUS EVERY 4 HOURS
Refills: 0 | Status: DISCONTINUED | OUTPATIENT
Start: 2020-09-08 | End: 2020-09-09

## 2020-09-08 RX ORDER — CEFAZOLIN SODIUM 1 G
2000 VIAL (EA) INJECTION EVERY 8 HOURS
Refills: 0 | Status: COMPLETED | OUTPATIENT
Start: 2020-09-08 | End: 2020-09-09

## 2020-09-08 RX ORDER — LANOLIN ALCOHOL/MO/W.PET/CERES
5 CREAM (GRAM) TOPICAL AT BEDTIME
Refills: 0 | Status: DISCONTINUED | OUTPATIENT
Start: 2020-09-08 | End: 2020-09-09

## 2020-09-08 RX ORDER — HYDROMORPHONE HYDROCHLORIDE 2 MG/ML
2 INJECTION INTRAMUSCULAR; INTRAVENOUS; SUBCUTANEOUS EVERY 4 HOURS
Refills: 0 | Status: DISCONTINUED | OUTPATIENT
Start: 2020-09-08 | End: 2020-09-09

## 2020-09-08 RX ORDER — HYDROMORPHONE HYDROCHLORIDE 2 MG/ML
4 INJECTION INTRAMUSCULAR; INTRAVENOUS; SUBCUTANEOUS EVERY 6 HOURS
Refills: 0 | Status: DISCONTINUED | OUTPATIENT
Start: 2020-09-08 | End: 2020-09-08

## 2020-09-08 RX ORDER — SCOPALAMINE 1 MG/3D
1 PATCH, EXTENDED RELEASE TRANSDERMAL ONCE
Refills: 0 | Status: COMPLETED | OUTPATIENT
Start: 2020-09-08 | End: 2020-09-08

## 2020-09-08 RX ORDER — OXYCODONE HYDROCHLORIDE 5 MG/1
5 TABLET ORAL EVERY 4 HOURS
Refills: 0 | Status: DISCONTINUED | OUTPATIENT
Start: 2020-09-08 | End: 2020-09-08

## 2020-09-08 RX ORDER — BUPIVACAINE 13.3 MG/ML
20 INJECTION, SUSPENSION, LIPOSOMAL INFILTRATION ONCE
Refills: 0 | Status: DISCONTINUED | OUTPATIENT
Start: 2020-09-08 | End: 2020-09-09

## 2020-09-08 RX ORDER — OXYCODONE HYDROCHLORIDE 5 MG/1
10 TABLET ORAL EVERY 4 HOURS
Refills: 0 | Status: DISCONTINUED | OUTPATIENT
Start: 2020-09-08 | End: 2020-09-08

## 2020-09-08 RX ORDER — ACETAMINOPHEN 500 MG
1000 TABLET ORAL EVERY 8 HOURS
Refills: 0 | Status: COMPLETED | OUTPATIENT
Start: 2020-09-08 | End: 2020-09-09

## 2020-09-08 RX ORDER — HYDROMORPHONE HYDROCHLORIDE 2 MG/ML
0.5 INJECTION INTRAMUSCULAR; INTRAVENOUS; SUBCUTANEOUS EVERY 4 HOURS
Refills: 0 | Status: DISCONTINUED | OUTPATIENT
Start: 2020-09-08 | End: 2020-09-09

## 2020-09-08 RX ADMIN — Medication 500 MILLIGRAM(S): at 19:20

## 2020-09-08 RX ADMIN — SCOPALAMINE 1 PATCH: 1 PATCH, EXTENDED RELEASE TRANSDERMAL at 19:09

## 2020-09-08 RX ADMIN — Medication 100 MILLIGRAM(S): at 21:13

## 2020-09-08 RX ADMIN — SCOPALAMINE 1 PATCH: 1 PATCH, EXTENDED RELEASE TRANSDERMAL at 11:39

## 2020-09-08 RX ADMIN — Medication 975 MILLIGRAM(S): at 22:14

## 2020-09-08 RX ADMIN — Medication 975 MILLIGRAM(S): at 21:14

## 2020-09-08 NOTE — PHYSICAL THERAPY INITIAL EVALUATION ADULT - ACTIVE RANGE OF MOTION EXAMINATION, REHAB EVAL
bilateral upper extremity Active ROM was WFL (within functional limits)/Left LE Active ROM was WFL (within functional limits)/Right knee 0-90 degrees

## 2020-09-08 NOTE — PHYSICAL THERAPY INITIAL EVALUATION ADULT - ASR EQUIP NEEDS DISCH PT EVAL
rolling walker (5 inch wheels)/standard cane/(re-confirm with pt as she stated she may be getting a device from someone close to her)

## 2020-09-08 NOTE — PHYSICAL THERAPY INITIAL EVALUATION ADULT - GENERAL OBSERVATIONS, REHAB EVAL
Patient received in semi-barragan position, no apparent distress, +hep lock, +sequential pneumatic compression device, +hemo vac.

## 2020-09-08 NOTE — PHYSICAL THERAPY INITIAL EVALUATION ADULT - WEIGHT-BEARING RESTRICTIONS: GAIT, REHAB EVAL
RX refilled per Dr. Mart's protocol and eprescribed to preferred pharmacy.      
weight-bearing as tolerated

## 2020-09-09 ENCOUNTER — TRANSCRIPTION ENCOUNTER (OUTPATIENT)
Age: 65
End: 2020-09-09

## 2020-09-09 VITALS
RESPIRATION RATE: 18 BRPM | HEART RATE: 56 BPM | SYSTOLIC BLOOD PRESSURE: 101 MMHG | OXYGEN SATURATION: 96 % | TEMPERATURE: 98 F | DIASTOLIC BLOOD PRESSURE: 61 MMHG

## 2020-09-09 LAB
ANION GAP SERPL CALC-SCNC: 11 MMOL/L — SIGNIFICANT CHANGE UP (ref 5–17)
BUN SERPL-MCNC: 9 MG/DL — SIGNIFICANT CHANGE UP (ref 7–23)
CALCIUM SERPL-MCNC: 9.1 MG/DL — SIGNIFICANT CHANGE UP (ref 8.4–10.5)
CHLORIDE SERPL-SCNC: 104 MMOL/L — SIGNIFICANT CHANGE UP (ref 96–108)
CO2 SERPL-SCNC: 25 MMOL/L — SIGNIFICANT CHANGE UP (ref 22–31)
CREAT SERPL-MCNC: 0.51 MG/DL — SIGNIFICANT CHANGE UP (ref 0.5–1.3)
GLUCOSE SERPL-MCNC: 125 MG/DL — HIGH (ref 70–99)
HCT VFR BLD CALC: 35.4 % — SIGNIFICANT CHANGE UP (ref 34.5–45)
HGB BLD-MCNC: 11.1 G/DL — LOW (ref 11.5–15.5)
MCHC RBC-ENTMCNC: 28.4 PG — SIGNIFICANT CHANGE UP (ref 27–34)
MCHC RBC-ENTMCNC: 31.4 GM/DL — LOW (ref 32–36)
MCV RBC AUTO: 90.5 FL — SIGNIFICANT CHANGE UP (ref 80–100)
NRBC # BLD: 0 /100 WBCS — SIGNIFICANT CHANGE UP (ref 0–0)
PLATELET # BLD AUTO: 257 K/UL — SIGNIFICANT CHANGE UP (ref 150–400)
POTASSIUM SERPL-MCNC: 4.1 MMOL/L — SIGNIFICANT CHANGE UP (ref 3.5–5.3)
POTASSIUM SERPL-SCNC: 4.1 MMOL/L — SIGNIFICANT CHANGE UP (ref 3.5–5.3)
RBC # BLD: 3.91 M/UL — SIGNIFICANT CHANGE UP (ref 3.8–5.2)
RBC # FLD: 15.3 % — HIGH (ref 10.3–14.5)
SODIUM SERPL-SCNC: 140 MMOL/L — SIGNIFICANT CHANGE UP (ref 135–145)
WBC # BLD: 13.57 K/UL — HIGH (ref 3.8–10.5)
WBC # FLD AUTO: 13.57 K/UL — HIGH (ref 3.8–10.5)

## 2020-09-09 PROCEDURE — 27447 TOTAL KNEE ARTHROPLASTY: CPT

## 2020-09-09 PROCEDURE — 73560 X-RAY EXAM OF KNEE 1 OR 2: CPT

## 2020-09-09 PROCEDURE — 20680 REMOVAL OF IMPLANT DEEP: CPT

## 2020-09-09 PROCEDURE — C1713: CPT

## 2020-09-09 PROCEDURE — 97161 PT EVAL LOW COMPLEX 20 MIN: CPT

## 2020-09-09 PROCEDURE — C1776: CPT

## 2020-09-09 PROCEDURE — 80048 BASIC METABOLIC PNL TOTAL CA: CPT

## 2020-09-09 PROCEDURE — 85027 COMPLETE CBC AUTOMATED: CPT

## 2020-09-09 PROCEDURE — 97116 GAIT TRAINING THERAPY: CPT

## 2020-09-09 PROCEDURE — 36415 COLL VENOUS BLD VENIPUNCTURE: CPT

## 2020-09-09 RX ORDER — ACETAMINOPHEN 500 MG
0 TABLET ORAL
Qty: 0 | Refills: 0 | DISCHARGE

## 2020-09-09 RX ORDER — ENOXAPARIN SODIUM 100 MG/ML
40 INJECTION SUBCUTANEOUS
Qty: 13 | Refills: 0
Start: 2020-09-09 | End: 2020-09-21

## 2020-09-09 RX ORDER — ASPIRIN/CALCIUM CARB/MAGNESIUM 324 MG
1 TABLET ORAL
Qty: 56 | Refills: 0
Start: 2020-09-09 | End: 2020-10-06

## 2020-09-09 RX ORDER — HYDROMORPHONE HYDROCHLORIDE 2 MG/ML
1 INJECTION INTRAMUSCULAR; INTRAVENOUS; SUBCUTANEOUS
Qty: 30 | Refills: 0
Start: 2020-09-09 | End: 2020-09-13

## 2020-09-09 RX ADMIN — Medication 975 MILLIGRAM(S): at 06:23

## 2020-09-09 RX ADMIN — SCOPALAMINE 1 PATCH: 1 PATCH, EXTENDED RELEASE TRANSDERMAL at 06:55

## 2020-09-09 RX ADMIN — HYDROMORPHONE HYDROCHLORIDE 2 MILLIGRAM(S): 2 INJECTION INTRAMUSCULAR; INTRAVENOUS; SUBCUTANEOUS at 10:34

## 2020-09-09 RX ADMIN — Medication 1 MILLIGRAM(S): at 12:06

## 2020-09-09 RX ADMIN — HYDROMORPHONE HYDROCHLORIDE 2 MILLIGRAM(S): 2 INJECTION INTRAMUSCULAR; INTRAVENOUS; SUBCUTANEOUS at 09:34

## 2020-09-09 RX ADMIN — HYDROMORPHONE HYDROCHLORIDE 4 MILLIGRAM(S): 2 INJECTION INTRAMUSCULAR; INTRAVENOUS; SUBCUTANEOUS at 17:55

## 2020-09-09 RX ADMIN — ENOXAPARIN SODIUM 40 MILLIGRAM(S): 100 INJECTION SUBCUTANEOUS at 07:15

## 2020-09-09 RX ADMIN — POLYETHYLENE GLYCOL 3350 17 GRAM(S): 17 POWDER, FOR SOLUTION ORAL at 12:06

## 2020-09-09 RX ADMIN — HYDROMORPHONE HYDROCHLORIDE 2 MILLIGRAM(S): 2 INJECTION INTRAMUSCULAR; INTRAVENOUS; SUBCUTANEOUS at 01:12

## 2020-09-09 RX ADMIN — HYDROMORPHONE HYDROCHLORIDE 2 MILLIGRAM(S): 2 INJECTION INTRAMUSCULAR; INTRAVENOUS; SUBCUTANEOUS at 14:06

## 2020-09-09 RX ADMIN — Medication 975 MILLIGRAM(S): at 05:23

## 2020-09-09 RX ADMIN — SCOPALAMINE 1 PATCH: 1 PATCH, EXTENDED RELEASE TRANSDERMAL at 16:09

## 2020-09-09 RX ADMIN — Medication 100 MILLIGRAM(S): at 05:23

## 2020-09-09 RX ADMIN — HYDROMORPHONE HYDROCHLORIDE 2 MILLIGRAM(S): 2 INJECTION INTRAMUSCULAR; INTRAVENOUS; SUBCUTANEOUS at 15:46

## 2020-09-09 RX ADMIN — Medication 500 MILLIGRAM(S): at 05:23

## 2020-09-09 RX ADMIN — Medication 975 MILLIGRAM(S): at 15:06

## 2020-09-09 RX ADMIN — HYDROMORPHONE HYDROCHLORIDE 2 MILLIGRAM(S): 2 INJECTION INTRAMUSCULAR; INTRAVENOUS; SUBCUTANEOUS at 00:12

## 2020-09-09 RX ADMIN — Medication 975 MILLIGRAM(S): at 14:06

## 2020-09-09 NOTE — DISCHARGE NOTE PROVIDER - NSDCACTIVITY_GEN_ALL_CORE
Do not drive or operate machinery/Stairs allowed/Walking - Indoors allowed/No heavy lifting/straining/Showering allowed/Walking - Outdoors allowed

## 2020-09-09 NOTE — PROGRESS NOTE ADULT - ASSESSMENT
S/p R TKA local hardware removal on 9/8    -WBAT to RLE  -PAin control, continue PO regimen  -PT continue  -Drain d/lokesh  -Ok for d/c to home today pending PT and pain control S/p R TKA local hardware removal on 9/8    -WBAT to RLE  -PAin control, continue PO regimen  -PT continue  -DVT proph: Lovenox  -Drain d/lokesh  -Ok for d/c to home today pending PT and pain control

## 2020-09-09 NOTE — PROGRESS NOTE ADULT - REASON FOR ADMISSION
Right Knee DJD with pain, swelling and limited ambulation. Non-operative treatment failed.
Right Knee DJD with pain, swelling and limited ambulation. Non-operative treatment failed.

## 2020-09-09 NOTE — DISCHARGE NOTE PROVIDER - NSDCMRMEDTOKEN_GEN_ALL_CORE_FT
Aleve 220 mg oral tablet: orally prn, As Needed  Tylenol 325 mg oral tablet: orally prn, As Needed enoxaparin 40 mg/0.4 mL injectable solution: 40 milligram(s) injectable once a day through 9/22/20    dispense: 13 syringes  HYDROmorphone 2 mg oral tablet: 1 tab(s) orally every 4 hours, As needed, Severe Pain MDD:6  Tylenol 325 mg oral tablet: 2 tablets every 4-6 hours, as needed    do not exceed 3250mg in one day (10 tablets) Aspirin Enteric Coated 81 mg oral delayed release tablet: 1 tab(s) orally 2 times a day for 4 weeks  START AFTER COMPLETION OF ENOXAPARIN INJECTIONS  enoxaparin 40 mg/0.4 mL injectable solution: 40 milligram(s) injectable once a day through 9/22/20    dispense: 13 syringes  HYDROmorphone 2 mg oral tablet: 1 tab(s) orally every 4 hours, As needed, Severe Pain MDD:6  Tylenol 325 mg oral tablet: 2 tablets every 4-6 hours, as needed    do not exceed 3250mg in one day (10 tablets)

## 2020-09-09 NOTE — DISCHARGE NOTE PROVIDER - NSDCCPCAREPLAN_GEN_ALL_CORE_FT
PRINCIPAL DISCHARGE DIAGNOSIS  Diagnosis: Primary osteoarthritis of right knee  Assessment and Plan of Treatment: improvement s/p Right TKR

## 2020-09-09 NOTE — PROGRESS NOTE ADULT - SUBJECTIVE AND OBJECTIVE BOX
Ortho Note    Pt comfortable without complaints, pain controlled  Denies CP, SOB, N/V, numbness/tingling     Vital Signs Last 24 Hrs  T(C): 36.7 (09-09-20 @ 08:28), Max: 36.7 (09-09-20 @ 08:28)  T(F): 98.1 (09-09-20 @ 08:28), Max: 98.1 (09-09-20 @ 08:28)  HR: 56 (09-09-20 @ 08:28) (56 - 56)  BP: 103/66 (09-09-20 @ 08:28) (103/66 - 103/66)  BP(mean): --  RR: 15 (09-09-20 @ 08:28) (15 - 15)  SpO2: 99% (09-09-20 @ 08:28) (99% - 99%)  AVSS    focused exam:  General: Pt Alert and oriented, NAD  DSG C/D/I  Pulses: +2DP, WWP feet  Sensation: SILT BLE  Motor: 5/5 EHL/FHL/TA/GS                          11.1   13.57 )-----------( 257      ( 09 Sep 2020 10:28 )             35.4   09 Sep 2020 05:53    140    |  104    |  9      ----------------------------<  125    4.1     |  25     |  0.51           A/P: 65yFemale POD#1 s/p right total knee replacement  - Stable  - Pain Control  - DVT ppx: lovenox 40mg qd x 14 days, then ASA 81mg bid x 4 weeks  - PT, WBS: WBAT  - OOB for meals, I/S  - continue bowel regimen  - dispo: home with [facility]    Ortho Pager [phone]

## 2020-09-09 NOTE — DISCHARGE NOTE PROVIDER - NSDCFUADDINST_GEN_ALL_CORE_FT
***You are on blood thinners to prevent blood clots.  Administer one enoxaparin (lovenox) 40mg injection daily for fourteen days total after surgery.  After completion of the enoxaparin (lovenox) injections. start ecotrin (enteric coated aspirin therapy) 325mg two times daily for an additional 4 weeks.***  Weight bearing as tolerated with rolling walker  No strenuous activity, heavy lifting, driving or returning to work until cleared by MD.  You may shower - dressing is water-resistant, no soaking in bathtubs.  Remove dressing after post op day 10, then leave incision open to air. Keep incision clean and dry.  Try to have regular bowel movements, take stool softener or laxative if necessary.  Swelling may travel all the way down leg to foot, this is normal and will subside in a few weeks.  Call to schedule an appt with Dr. Duong for follow up, if you have staples or sutures they will be removed in office.  Contact your doctor if you experience: fever greater than 101.5, chills, chest pain, difficulty breathing, redness or excessive drainage around the incision, other concerns.  Follow up with your primary care provider. ***You are on blood thinners to prevent blood clots.  Administer one enoxaparin (lovenox) 40mg injection daily for fourteen days total after surgery.  After completion of the enoxaparin (lovenox) injections. start ecotrin (enteric coated aspirin therapy) 81mg two times daily for an additional 4 weeks.***  Weight bearing as tolerated with rolling walker  No strenuous activity, heavy lifting, driving or returning to work until cleared by MD.  You may shower - dressing is water-resistant, no soaking in bathtubs.  Remove dressing after post op day 10, then leave incision open to air. Keep incision clean and dry.  Try to have regular bowel movements, take stool softener or laxative if necessary.  Swelling may travel all the way down leg to foot, this is normal and will subside in a few weeks.  Call to schedule an appt with Dr. Duong for follow up, if you have staples or sutures they will be removed in office.  Contact your doctor if you experience: fever greater than 101.5, chills, chest pain, difficulty breathing, redness or excessive drainage around the incision, other concerns.  Follow up with your primary care provider.

## 2020-09-09 NOTE — PROGRESS NOTE ADULT - SUBJECTIVE AND OBJECTIVE BOX
Pt doing well this AM, reading magazine. Pain controlled. Denies f/c/n/v. Ambulated down halls with PT yesterday. +voids, no BM. Tolerating diet. Feels ready for d/c today    PE:   gen: aaox3. NAD  RLE: Drain w/ 5cc out overnight, removed. Expected swelling. Neg hohmans. +TA/GSC/FHL/EHL. SILT grossly to foot. Warm foot with BCR.

## 2020-09-09 NOTE — DISCHARGE NOTE PROVIDER - NSDCHHNEEDSERVICE_GEN_ALL_CORE
Medication teaching and assessment/Other, specify.../Observation and assessment/Rehabilitation services/Wound care and assessment/Teaching and training

## 2020-09-09 NOTE — DISCHARGE NOTE PROVIDER - HOSPITAL COURSE
Admitted    Surgery Right knee JAROD, TKR    Chelita-op Antibiotics    Pain control    DVT prophylaxis    OOB/Physical Therapy

## 2020-09-09 NOTE — DISCHARGE NOTE NURSING/CASE MANAGEMENT/SOCIAL WORK - PATIENT PORTAL LINK FT
You can access the FollowMyHealth Patient Portal offered by [facility] by registering at the following website: [email or web address]. By joining [facility]’s FollowMyHealth portal, you will also be able to view your health information using other applications (apps) compatible with our system.

## 2020-09-09 NOTE — DISCHARGE NOTE PROVIDER - CARE PROVIDER_API CALL
Lionel Duong  ORTHOPAEDIC SURGERY  [address], [address] [place] [address]  Phone: [phone]  Fax: [phone]  Follow Up Time: 2 weeks

## 2020-09-11 DIAGNOSIS — Z96.652 PRESENCE OF LEFT ARTIFICIAL KNEE JOINT: ICD-10-CM

## 2020-09-14 DIAGNOSIS — E66.9 OBESITY, UNSPECIFIED: ICD-10-CM

## 2020-09-14 DIAGNOSIS — M17.31 UNILATERAL POST-TRAUMATIC OSTEOARTHRITIS, RIGHT KNEE: ICD-10-CM

## 2020-09-14 DIAGNOSIS — M25.761 OSTEOPHYTE, RIGHT KNEE: ICD-10-CM

## 2020-09-14 DIAGNOSIS — Z96.652 PRESENCE OF LEFT ARTIFICIAL KNEE JOINT: ICD-10-CM

## 2020-09-14 DIAGNOSIS — E78.5 HYPERLIPIDEMIA, UNSPECIFIED: ICD-10-CM

## 2020-09-14 DIAGNOSIS — I34.0 NONRHEUMATIC MITRAL (VALVE) INSUFFICIENCY: ICD-10-CM

## 2020-09-14 DIAGNOSIS — Z88.8 ALLERGY STATUS TO OTHER DRUGS, MEDICAMENTS AND BIOLOGICAL SUBSTANCES: ICD-10-CM

## 2020-09-14 DIAGNOSIS — Z88.5 ALLERGY STATUS TO NARCOTIC AGENT: ICD-10-CM

## 2020-09-14 RX ORDER — HYDROMORPHONE HYDROCHLORIDE 2 MG/1
2 TABLET ORAL
Qty: 40 | Refills: 0 | Status: ACTIVE | COMMUNITY
Start: 2020-09-14 | End: 1900-01-01

## 2020-09-28 ENCOUNTER — APPOINTMENT (OUTPATIENT)
Dept: ORTHOPEDIC SURGERY | Facility: CLINIC | Age: 65
End: 2020-09-28
Payer: MEDICARE

## 2020-09-28 VITALS — BODY MASS INDEX: 32.96 KG/M2 | HEIGHT: 67 IN | WEIGHT: 210 LBS

## 2020-09-28 PROBLEM — M19.90 UNSPECIFIED OSTEOARTHRITIS, UNSPECIFIED SITE: Chronic | Status: ACTIVE | Noted: 2020-09-04

## 2020-09-28 PROBLEM — E78.5 HYPERLIPIDEMIA, UNSPECIFIED: Chronic | Status: ACTIVE | Noted: 2020-09-04

## 2020-09-28 PROCEDURE — 99024 POSTOP FOLLOW-UP VISIT: CPT

## 2020-09-28 NOTE — PROCEDURE
[de-identified] : Observation on incision dry, clean, intact, well healed. Method staple removing kit. Suture site Cleaned with iodine swab after sutures are completely removed. Instructions Keep incision dry and clean, allowed to shower and pat site dry, do not rub dry, contact office is site becomes red, swollen, infected, or you develop a fever. \par \par

## 2020-09-28 NOTE — HISTORY OF PRESENT ILLNESS
[Chills] : no chills [Constipation] : no constipation [Diarrhea] : no diarrhea [Dysuria] : no dysuria [Fever] : no fever [Nausea] : no nausea [Vomiting] : no vomiting [Clean/Dry/Intact] : clean, dry and intact [Healed] : healed [Erythema] : not erythematous [Discharge] : absent of discharge [Dehiscence] : not dehisced [Neuro Intact] : an unremarkable neurological exam [Vascular Intact] : ~T peripheral vascular exam normal [Negative Alecia's] : maneuvers demonstrated a negative Alecia's sign [Doing Well] : is doing well [No Sign of Infection] : is showing no signs of infection [Adequate Pain Control] : has adequate pain control [Staples Removed] : staples were removed [de-identified] : Post-op visit [de-identified] : Patient presents for her F/U S/P Right TKR done 3 weeks ago. Patient is doing very well and undergoing P.T> with improvement. [de-identified] : ROM 0-90 degrees, calf is soft and non-tender [de-identified] : Continue P.T., pain management and DVT prophylaxis. F/U in 1 month with Right knee x-rays.

## 2020-10-26 ENCOUNTER — APPOINTMENT (OUTPATIENT)
Dept: ORTHOPEDIC SURGERY | Facility: CLINIC | Age: 65
End: 2020-10-26
Payer: MEDICARE

## 2020-10-26 NOTE — PHYSICAL EXAM
[de-identified] : Left knee\par Inspection: no effusion or erythema.\par Wounds: curvy linear anterior incision, lateral incision, \par Alignment: normal.\par Palpation: no specific tenderness on palpation.\par ROM active (in degrees): 0-115\par Ligamentous laxity: negative ant. drawer test, negative post. drawer test, stable to varus stress test, stable to valgus stress test. \par Patellofemoral Alignment Test: Q angle-, normal.\par Muscle Test: good quad strength.\par Leg examination: calf is soft and non-tender.\par \par Right knee\par Inspection: no effusion or erythema.\par Wounds: healed medial incision, healed arthroscopic portals \par Alignment: normal.\par Palpation: medial tibial tenderness on palpation.\par ROM active (in degrees): 0-125 with crepitus and discomfort through the arc of motion \par Ligamentous laxity: positive ant. drawer test, negative post. drawer test, stable to varus stress test, stable to valgus stress test. negative Lachman's test, negative pivot shift test\par Meniscal Test: negative McMurrays, negative Maria Eugenia.\par Patellofemoral Alignment Test: Q angle-, normal.\par Muscle Test: good quad strength.\par Leg examination: calf is soft and non-tender. [de-identified] : Left knee xrays, AP, lateral, merchant view, taken at the office today demonstrates a total knee replacement in satisfactory position and alignment. No evidence of loosening. The unsurfaced patella sits in a central position\par  \par Right knee xrays, standing AP/Lateral, Merchant, and 45 degree PA standing view, taken at the office today shows diffuse tricompartmental degenerative arthritis, medial joint space narrowing, sclerosis, bone on bone, marginal osteophytes, patella sits at an appropriate height in a central position with joint space narrowing and osteophyte formation, Kellgren and Roney grade 4, evidence of ACL reconstruction, femoral tibial screws noted.

## 2020-10-26 NOTE — DISCUSSION/SUMMARY
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 Due to the pain, failure of prior nonoperative treatment including injections, NSAIDs, and physiotherapy, and associated disability I recommend a right total knee replacement. The risks, benefits, convalescence and alternatives were reviewed. Numerous questions were asked and answered. Models were used as an educational tool.We did discuss implant choice and fixation, with shared decision making with the patient. Hardware in the tibia may need to be removed if in the way of the TKR. Surgery is scheduled for September 8, 2020. Preop medical and cardiac clearance. Her left TKR is doing well and she is overall very happy.

## 2020-10-26 NOTE — HISTORY OF PRESENT ILLNESS
[de-identified] : 65 year old female presents for follow up evaluation of right knee pain. Her left knee is s/p TKR about 21 years ago, which she states is doing fine. Her right knee is symptomatic. She underwent nonoperative treatment, including gel shots done in January by a doctor near her home. She also had a lot of PT in January and March before COVID-19. She states she has a lot of pain in her knee and is on the schedule for a TKR on September 8, but is thinking of moving it earlier depending on Dr. Duong's schedule. Patient is putting more knee on the left TKR and is worried she will make her TKR worse. She is taking Aleve BID with help. She is also on the schedule to see her cardiologist and would like to proceed with the surgery.

## 2020-11-02 ENCOUNTER — APPOINTMENT (OUTPATIENT)
Dept: ORTHOPEDIC SURGERY | Facility: CLINIC | Age: 65
End: 2020-11-02
Payer: MEDICARE

## 2020-11-02 DIAGNOSIS — Z47.1 AFTERCARE FOLLOWING JOINT REPLACEMENT SURGERY: ICD-10-CM

## 2020-11-02 DIAGNOSIS — Z96.651 AFTERCARE FOLLOWING JOINT REPLACEMENT SURGERY: ICD-10-CM

## 2020-11-02 PROCEDURE — 73560 X-RAY EXAM OF KNEE 1 OR 2: CPT | Mod: LT

## 2020-11-02 PROCEDURE — 73562 X-RAY EXAM OF KNEE 3: CPT | Mod: RT

## 2020-11-02 PROCEDURE — 99024 POSTOP FOLLOW-UP VISIT: CPT

## 2020-11-02 NOTE — ADDENDUM
[FreeTextEntry1] : \par This note was written by Javon Pena  on 11/02//2020  acting as scribe for Dr. Lionel Duong M.D.\par \par I, Dr. Lionel Duong, have read and attest that all the information, medical decision making and discharge instructions within are true and accurate.\par \par

## 2020-11-02 NOTE — HISTORY OF PRESENT ILLNESS
[de-identified] : 65 year old female presents for follow up evaluation, 7 weeks s/p RIGHT TKR. She s doing very well and is going to physical therapy twice a week. She is continuing to make significant progress and denies using an assistive devices. Patient is taking Aleve prn for pain control and prior to physical therapy she takes the Aleve which gives her significant relief. She has no wound compilations and is very pleased with her surgical outcome. Denies: Neurovascular compromise, fever, chills.\par

## 2020-11-02 NOTE — DISCUSSION/SUMMARY
[de-identified] : Discussed at length the nature of the patient’s condition. Their RIGHT TKR is doing excellent and have reassured her that her implants are functioning well. Will have patient continue with he physical therapy. They can continue activities as tolerated. Will have patient follow up with me here in the clinic in 6-8 weeks.

## 2020-11-02 NOTE — PHYSICAL EXAM
[de-identified] : Right knee\par Inspection: no effusion or erythema.\par Wounds: healed medial incision, healed arthroscopic portals \par Alignment: normal.\par Palpation: medial tibial tenderness on palpation.\par ROM active (in degrees): 0-105 no instability and good strength \par Ligamentous laxity: positive ant. drawer test, negative post. drawer test, stable to varus stress test, stable to valgus stress test. negative Lachman's test, negative pivot shift test\par Meniscal Test: negative McMurrays, negative Maria Eugenia.\par Patellofemoral Alignment Test: Q angle-, normal.\par Muscle Test: good quad strength.\par Leg examination: calf is soft and non-tender.\par \par Left knee\par Inspection: no effusion or erythema.\par Wounds: curvy linear anterior incision, lateral incision, \par Alignment: normal.\par Palpation: no specific tenderness on palpation.\par ROM active (in degrees): 0-115\par Ligamentous laxity: negative ant. drawer test, negative post. drawer test, stable to varus stress test, stable to valgus stress test. \par Patellofemoral Alignment Test: Q angle-, normal.\par Muscle Test: good quad strength.\par Leg examination: calf is soft and non-tender. [de-identified] : LEFT knee xray, merchant view, taken at the office today demonstrates a total knee replacement in satisfactory position, unsurfaced patella sits in central position and normal alignment. No evidence of loosening. \par  \par RIGHT TKR  xrays, 3 views standing AP/Lateral, Merchant, and 45 degree PA standing view, taken at the office today shows unsurfaced patella sits in central position and in normal alignment, evidence of ACL reconstruction, staple noted in the medial aspect of the knee, fixation device in femoral condyle noted, and femoral tibial screws noted.\par \par

## 2020-12-14 ENCOUNTER — APPOINTMENT (OUTPATIENT)
Dept: ORTHOPEDIC SURGERY | Facility: CLINIC | Age: 65
End: 2020-12-14
Payer: MEDICARE

## 2020-12-14 ENCOUNTER — APPOINTMENT (OUTPATIENT)
Dept: ORTHOPEDIC SURGERY | Facility: CLINIC | Age: 65
End: 2020-12-14

## 2020-12-14 PROCEDURE — G2012 BRIEF CHECK IN BY MD/QHP: CPT

## 2021-02-13 DIAGNOSIS — Z23 ENCOUNTER FOR IMMUNIZATION: ICD-10-CM

## 2021-03-15 ENCOUNTER — APPOINTMENT (OUTPATIENT)
Dept: ORTHOPEDIC SURGERY | Facility: CLINIC | Age: 66
End: 2021-03-15
Payer: MEDICARE

## 2021-03-15 DIAGNOSIS — Z96.651 PRESENCE OF RIGHT ARTIFICIAL KNEE JOINT: ICD-10-CM

## 2021-03-15 DIAGNOSIS — M17.31 UNILATERAL POST-TRAUMATIC OSTEOARTHRITIS, RIGHT KNEE: ICD-10-CM

## 2021-03-15 PROCEDURE — 73560 X-RAY EXAM OF KNEE 1 OR 2: CPT | Mod: LT

## 2021-03-15 PROCEDURE — 73562 X-RAY EXAM OF KNEE 3: CPT | Mod: RT

## 2021-03-15 PROCEDURE — 99212 OFFICE O/P EST SF 10 MIN: CPT

## 2021-03-15 NOTE — PHYSICAL EXAM
[de-identified] : Right knee\par Inspection: mild effusion \par Wounds: healed medial incision, healed arthroscopic portals \par Alignment: normal.\par Palpation: medial tibial tenderness on palpation\par ROM active (in degrees): 0-110 no instability and good strength \par Ligamentous laxity: positive ant. drawer test, negative post. drawer test, stable to varus stress test, stable to valgus stress test. negative Lachman's test, negative pivot shift test\par Meniscal Test: negative McMurrays, negative Maria Eugenia.\par Patellofemoral Alignment Test: Q angle-, normal.\par Muscle Test: good quad strength.\par Leg examination: calf is soft and non-tender.\par \par Left knee\par Inspection: no effusion or erythema.\par Wounds: curvy linear anterior incision, lateral incision, \par Alignment: normal.\par Palpation: no specific tenderness on palpation.\par ROM active (in degrees): 0-115\par Ligamentous laxity: negative ant. drawer test, negative post. drawer test, stable to varus stress test, stable to valgus stress test. \par Patellofemoral Alignment Test: Q angle-, normal.\par Muscle Test: good quad strength.\par Leg examination: calf is soft and non-tender. [de-identified] : LEFT knee xray, merchant view, taken at the office today demonstrates a total knee replacement in satisfactory position, unsurfaced patella sits in central position and normal alignment. No evidence of loosening. \par  \par RIGHT knee xrays, 3 views standing AP/Lateral, Merchant, and 45 degree PA standing view, taken at the office today demonstrates a total knee replacement unsurfaced patella sits in central position and in normal alignment, evidence of ACL reconstruction, staple noted in the medial aspect of the knee, fixation device in femoral condyle noted, and femoral tibial screws noted.\par \par \par

## 2021-03-15 NOTE — DISCUSSION/SUMMARY
[de-identified] : Discussed at length the nature of the patient’s condition. Their RIGHT TKR is doing well. In regards to her her recent right knee symptoms they appear to be a muscular strain following her playing piquet ball. I have reassured her that her implants are functioning well. Will have patient continue with her physical therapy. They can continue activities as tolerated. Will have patient follow up with me here in the clinic in 6 months with xrays of both knees.

## 2021-03-15 NOTE — ADDENDUM
[FreeTextEntry1] : This note was written by Javon Pena on 03/15/2021 acting scribe for Dr. Lionel Duong M.D.\par \par I Dr. Lionel Duong have read and attest that all the information, medical decision making, and discharge instructions within are true and accurate.

## 2021-03-15 NOTE — HISTORY OF PRESENT ILLNESS
[de-identified] : 65 year old female presents for follow up evaluation, 6 months s/p RIGHT TKR. She is continuing to make significant progress and denies using an assistive devices. Has been primarily doing home exercises and denies any instability in her knee. She did note some lateral sided knee pain after playing piquet ball. She states that taking some Aleve has helped with the pain which has been improving this last week. Denies any other complaints.

## 2021-07-16 ENCOUNTER — OFFICE VISIT (OUTPATIENT)
Dept: URGENT CARE | Age: 66
End: 2021-07-16
Payer: MEDICARE

## 2021-07-16 VITALS
DIASTOLIC BLOOD PRESSURE: 78 MMHG | RESPIRATION RATE: 20 BRPM | HEART RATE: 68 BPM | SYSTOLIC BLOOD PRESSURE: 162 MMHG | OXYGEN SATURATION: 97 % | TEMPERATURE: 97.5 F

## 2021-07-16 DIAGNOSIS — R21 RASH: Primary | ICD-10-CM

## 2021-07-16 PROCEDURE — 99213 OFFICE O/P EST LOW 20 MIN: CPT | Performed by: NURSE PRACTITIONER

## 2021-07-16 PROCEDURE — G0463 HOSPITAL OUTPT CLINIC VISIT: HCPCS | Performed by: NURSE PRACTITIONER

## 2021-07-16 NOTE — PROGRESS NOTES
[facility]        NAME: Marisol Doran is a 76 y o  female  : 1955    MRN: [id number]  DATE: 2021  TIME: 2:30 PM    Assessment and Plan   Rash [R21]  1  Rash           Patient Instructions   Apply cortisone cream to top of foot   Monitor for worsening redness, swelling, warmth, fever or chills  Follow up with PCP in 3-5 days  Proceed to  ER if symptoms worsen  Chief Complaint     Chief Complaint   Patient presents with    Foot Pain     pt states had sunburn on the tops of her feet a couple of days ago, now top of right foot is still red, and having some pain, hx of cellulitis of same foot, concerned it's becoming cellulitis again         History of Present Illness       Patient is a 76year old female presenting with concern of cellultis of her feet  She had sunburn 3 days ago  She noticed a rash on the right foot  Denies swelling, fever, or chills  No OTC medications  Review of Systems   Review of Systems   Constitutional: Negative for activity change, chills and fever  Musculoskeletal: Negative for gait problem and joint swelling  Skin: Positive for rash  Negative for color change  Neurological: Negative for headaches           Current Medications       Current Outpatient Medications:     Ascorbic Acid (VITAMIN C) 100 MG tablet, Take 100 mg by mouth daily, Disp: , Rfl:     Cholecalciferol (VITAMIN D3) 2000 units capsule, Vitamin D3, Disp: , Rfl:     glucosamine-chondroitin 500-400 MG tablet, Take 1 tablet by mouth 3 (three) times a day, Disp: , Rfl:     LIDOCAINE EX, 4 mL, Disp: , Rfl:     Turmeric 500 MG TABS, Take by mouth daily, Disp: , Rfl:     Current Allergies     Allergies as of 2021 - Reviewed 2021   Allergen Reaction Noted    Meperidine  2016    Oxycodone-acetaminophen  2016    Pollen extract  2016            The following portions of the patient's history were reviewed and updated as appropriate: allergies, current medications, past family history, past medical history, past social history, past surgical history and problem list      Past Medical History:   Diagnosis Date    Abnormal fasting glucose     Scoliosis        Past Surgical History:   Procedure Laterality Date    HYSTERECTOMY         Family History   Problem Relation Age of Onset    No Known Problems Mother          Medications have been verified  Objective   /78   Pulse 68   Temp 97 5 °F (36 4 °C)   Resp 20   SpO2 97%        Physical Exam     Physical Exam  Vitals reviewed  Constitutional:       General: She is awake  Appearance: Normal appearance  Cardiovascular:      Rate and Rhythm: Normal rate  Pulmonary:      Effort: Pulmonary effort is normal    Musculoskeletal:        Feet:    Skin:     General: Skin is warm and moist    Neurological:      General: No focal deficit present  Mental Status: She is alert, oriented to person, place, and time and easily aroused  Psychiatric:         Behavior: Behavior is cooperative

## 2021-07-16 NOTE — PATIENT INSTRUCTIONS
Apply cortisone cream to top of foot   Monitor for worsening redness, swelling, warmth, fever or chills  Dermatitis   WHAT YOU NEED TO KNOW:   Dermatitis is skin inflammation  You may have an itchy rash, redness, or swelling  You may also have bumps or blisters that crust over or ooze clear fluid  Dermatitis can be caused by allergens such as dust mites, pet dander, pollen, and certain foods  It can also develop when something touches your skin and irritates it or causes an allergic reaction  Examples include soaps, chemicals, latex, and poison ivy  DISCHARGE INSTRUCTIONS:   Call 911 if you have any of the following symptoms of anaphylaxis:   · Sudden trouble breathing    · Throat swelling and tightness    · Dizziness, lightheadedness, fainting, or confusion    Return to the emergency department if:   · You develop a fever or have red streaks going up your arm or leg  · Your rash gets more swollen, red, or hot  Contact your healthcare provider if:   · Your skin blisters, oozes white or yellow pus, or has a foul-smelling discharge  · Your rash spreads or does not get better, even after treatment  · You have questions or concerns about your condition or care  Medicines:   · Medicines  help decrease itching and inflammation, or treat a bacterial infection  They may be given as a topical cream, shot, or a pill  · Take your medicine as directed  Contact your healthcare provider if you think your medicine is not helping or if you have side effects  Tell him of her if you are allergic to any medicine  Keep a list of the medicines, vitamins, and herbs you take  Include the amounts, and when and why you take them  Bring the list or the pill bottles to follow-up visits  Carry your medicine list with you in case of an emergency  Apply a cool compress to your rash: This will help soothe your skin  Keep your skin moist:  Rub unscented cream or lotion on your skin to prevent dryness and itching   Do this right after a lukewarm bath or shower when your skin is still damp  Avoid skin irritants:  Do not use skin irritants, such as makeup, hair products, soaps, and cleansers  Use products that do not contain fragrances or dye  Follow up with your healthcare provider as directed:  Write down your questions so you remember to ask them during your visits  © Copyright [address] Information is for End User's use only and may not be sold, redistributed or otherwise used for commercial purposes  All illustrations and images included in CareNotes® are the copyrighted property of [employer]  or [facility] Etta Quinonez   The above information is an  only  It is not intended as medical advice for individual conditions or treatments  Talk to your doctor, nurse or pharmacist before following any medical regimen to see if it is safe and effective for you

## 2021-08-03 ENCOUNTER — OFFICE VISIT (OUTPATIENT)
Dept: INTERNAL MEDICINE CLINIC | Facility: CLINIC | Age: 66
End: 2021-08-03
Payer: MEDICARE

## 2021-08-03 VITALS
WEIGHT: 215 LBS | HEIGHT: 67 IN | TEMPERATURE: 98.2 F | HEART RATE: 83 BPM | OXYGEN SATURATION: 96 % | BODY MASS INDEX: 33.74 KG/M2 | SYSTOLIC BLOOD PRESSURE: 142 MMHG | DIASTOLIC BLOOD PRESSURE: 92 MMHG

## 2021-08-03 DIAGNOSIS — E78.49 OTHER HYPERLIPIDEMIA: ICD-10-CM

## 2021-08-03 DIAGNOSIS — Z13.0 SCREENING FOR DEFICIENCY ANEMIA: ICD-10-CM

## 2021-08-03 DIAGNOSIS — Z13.29 SCREENING FOR HYPOTHYROIDISM: ICD-10-CM

## 2021-08-03 DIAGNOSIS — Z12.11 COLON CANCER SCREENING: ICD-10-CM

## 2021-08-03 DIAGNOSIS — I10 ESSENTIAL HYPERTENSION: Primary | ICD-10-CM

## 2021-08-03 DIAGNOSIS — Z00.00 HEALTHCARE MAINTENANCE: ICD-10-CM

## 2021-08-03 PROBLEM — Z01.818 PREOP EXAM FOR INTERNAL MEDICINE: Status: RESOLVED | Noted: 2020-08-25 | Resolved: 2021-08-03

## 2021-08-03 PROCEDURE — G0439 PPPS, SUBSEQ VISIT: HCPCS | Performed by: INTERNAL MEDICINE

## 2021-08-03 PROCEDURE — 1123F ACP DISCUSS/DSCN MKR DOCD: CPT | Performed by: INTERNAL MEDICINE

## 2021-08-03 PROCEDURE — 99215 OFFICE O/P EST HI 40 MIN: CPT | Performed by: INTERNAL MEDICINE

## 2021-08-03 RX ORDER — LIDOCAINE HYDROCHLORIDE 10 MG/ML
4 INJECTION, SOLUTION INFILTRATION; PERINEURAL
COMMUNITY
End: 2022-08-04

## 2021-08-03 RX ORDER — LISINOPRIL 5 MG/1
5 TABLET ORAL DAILY
Qty: 30 TABLET | Refills: 1 | Status: SHIPPED | OUTPATIENT
Start: 2021-08-03 | End: 2022-08-04

## 2021-08-03 RX ORDER — TRIAMCINOLONE ACETONIDE 40 MG/ML
1 INJECTION, SUSPENSION INTRA-ARTICULAR; INTRAMUSCULAR
COMMUNITY
End: 2022-08-04

## 2021-08-03 NOTE — ASSESSMENT & PLAN NOTE
A follow-up assessment of lipid profile has been requested for this patient recommend she continue with a low-cholesterol diet and we will review the lipid profile with patient when it has been completed

## 2021-08-03 NOTE — ASSESSMENT & PLAN NOTE
Assessment of the patient's blood pressure today indicates a hypertensive reading of 142/92  She also was recently evaluated in urgent care and was found to have a blood pressure on the 16th of July of 162/78  In view of these 2 elevated blood pressure readings I have recommended to the patient that she embark on treatment for her hypertension  Will start lisinopril 5 mg daily taken once a day in the morning I have recommended strongly that the patient embark on a program to reduce her weight which may have a significant benefit in controlling her blood pressure  A comprehensive metabolic profile, lipid profile, thyroid screening studies have all been requested and will be reviewed with the patient when she returns in 3 weeks to recheck her blood pressure on current therapy

## 2021-08-03 NOTE — PATIENT INSTRUCTIONS
Medicare Preventive Visit Patient Instructions  Thank you for completing your Welcome to Medicare Visit or Medicare Annual Wellness Visit today  Your next wellness visit will be due in one year (8/4/2022)  The screening/preventive services that you may require over the next 5-10 years are detailed below  Some tests may not apply to you based off risk factors and/or age  Screening tests ordered at today's visit but not completed yet may show as past due  Also, please note that scanned in results may not display below  Preventive Screenings:  Service Recommendations Previous Testing/Comments   Colorectal Cancer Screening  * Colonoscopy    * Fecal Occult Blood Test (FOBT)/Fecal Immunochemical Test (FIT)  * Fecal DNA/Cologuard Test  * Flexible Sigmoidoscopy Age: 54-65 years old   Colonoscopy: every 10 years (may be performed more frequently if at higher risk)  OR  FOBT/FIT: every 1 year  OR  Cologuard: every 3 years  OR  Sigmoidoscopy: every 5 years  Screening may be recommended earlier than age 48 if at higher risk for colorectal cancer  Also, an individualized decision between you and your healthcare provider will decide whether screening between the ages of 74-80 would be appropriate  Colonoscopy: Not on file  FOBT/FIT: 05/27/2020  Cologuard: Not on file  Sigmoidoscopy: Not on file          Breast Cancer Screening Age: 36 years old  Frequency: every 1-2 years  Not required if history of left and right mastectomy Mammogram: 11/14/2017        Cervical Cancer Screening Between the ages of 21-29, pap smear recommended once every 3 years  Between the ages of 33-67, can perform pap smear with HPV co-testing every 5 years     Recommendations may differ for women with a history of total hysterectomy, cervical cancer, or abnormal pap smears in past  Pap Smear: Not on file    Screening Not Indicated   Hepatitis C Screening Once for adults born between [facility]  More frequently in patients at high risk for Hepatitis C Hep C Antibody: Not on file        Diabetes Screening 1-2 times per year if you're at risk for diabetes or have pre-diabetes Fasting glucose: 108 mg/dL   A1C: 5 8 %    Screening Current   Cholesterol Screening Once every 5 years if you don't have a lipid disorder  May order more often based on risk factors  Lipid panel: 05/27/2020    Screening Not Indicated  History Lipid Disorder     Other Preventive Screenings Covered by Medicare:  1  Abdominal Aortic Aneurysm (AAA) Screening: covered once if your at risk  You're considered to be at risk if you have a family history of AAA  2  Lung Cancer Screening: covers low dose CT scan once per year if you meet all of the following conditions: (1) Age 50-69; (2) No signs or symptoms of lung cancer; (3) Current smoker or have quit smoking within the last 15 years; (4) You have a tobacco smoking history of at least 30 pack years (packs per day multiplied by number of years you smoked); (5) You get a written order from a healthcare provider  3  Glaucoma Screening: covered annually if you're considered high risk: (1) You have diabetes OR (2) Family history of glaucoma OR (3)  aged 48 and older OR (3)  American aged 72 and older  3  Osteoporosis Screening: covered every 2 years if you meet one of the following conditions: (1) You're estrogen deficient and at risk for osteoporosis based off medical history and other findings; (2) Have a vertebral abnormality; (3) On glucocorticoid therapy for more than 3 months; (4) Have primary hyperparathyroidism; (5) On osteoporosis medications and need to assess response to drug therapy  · Last bone density test (DXA Scan): Not on file  5  HIV Screening: covered annually if you're between the age of 12-76  Also covered annually if you are younger than 13 and older than 72 with risk factors for HIV infection  For pregnant patients, it is covered up to 3 times per pregnancy      Immunizations:  Immunization Recommendations   Influenza Vaccine Annual influenza vaccination during flu season is recommended for all persons aged >= 6 months who do not have contraindications   Pneumococcal Vaccine (Prevnar and Pneumovax)  * Prevnar = PCV13  * Pneumovax = PPSV23   Adults 25-60 years old: 1-3 doses may be recommended based on certain risk factors  Adults 72 years old: Prevnar (PCV13) vaccine recommended followed by Pneumovax (PPSV23) vaccine  If already received PPSV23 since turning 65, then PCV13 recommended at least one year after PPSV23 dose  Hepatitis B Vaccine 3 dose series if at intermediate or high risk (ex: diabetes, end stage renal disease, liver disease)   Tetanus (Td) Vaccine - COST NOT COVERED BY MEDICARE PART B Following completion of primary series, a booster dose should be given every 10 years to maintain immunity against tetanus  Td may also be given as tetanus wound prophylaxis  Tdap Vaccine - COST NOT COVERED BY MEDICARE PART B Recommended at least once for all adults  For pregnant patients, recommended with each pregnancy  Shingles Vaccine (Shingrix) - COST NOT COVERED BY MEDICARE PART B  2 shot series recommended in those aged 48 and above     Health Maintenance Due:      Topic Date Due    Hepatitis C Screening  Never done    Breast Cancer Screening: Mammogram  11/14/2018    Colorectal Cancer Screening  05/27/2021     Immunizations Due:      Topic Date Due    COVID-19 Vaccine (1) Never done    Pneumococcal Vaccine: 65+ Years (1 of 1 - PPSV23) Never done    Influenza Vaccine (1) 09/01/2021     Advance Directives   What are advance directives? Advance directives are legal documents that state your wishes and plans for medical care  These plans are made ahead of time in case you lose your ability to make decisions for yourself  Advance directives can apply to any medical decision, such as the treatments you want, and if you want to donate organs  What are the types of advance directives?   There are many types of advance directives, and each state has rules about how to use them  You may choose a combination of any of the following:  · Living will: This is a written record of the treatment you want  You can also choose which treatments you do not want, which to limit, and which to stop at a certain time  This includes surgery, medicine, IV fluid, and tube feedings  · Durable power of  for healthcare [facility] [place]): This is a written record that states who you want to make healthcare choices for you when you are unable to make them for yourself  This person, called a proxy, is usually a family member or a friend  You may choose more than 1 proxy  · Do not resuscitate (DNR) order:  A DNR order is used in case your heart stops beating or you stop breathing  It is a request not to have certain forms of treatment, such as CPR  A DNR order may be included in other types of advance directives  · Medical directive: This covers the care that you want if you are in a coma, near death, or unable to make decisions for yourself  You can list the treatments you want for each condition  Treatment may include pain medicine, surgery, blood transfusions, dialysis, IV or tube feedings, and a ventilator (breathing machine)  · Values history: This document has questions about your views, beliefs, and how you feel and think about life  This information can help others choose the care that you would choose  Why are advance directives important? An advance directive helps you control your care  Although spoken wishes may be used, it is better to have your wishes written down  Spoken wishes can be misunderstood, or not followed  Treatments may be given even if you do not want them  An advance directive may make it easier for your family to make difficult choices about your care     Weight Management   Why it is important to manage your weight:  Being overweight increases your risk of health conditions such as heart disease, high blood pressure, type 2 diabetes, and certain types of cancer  It can also increase your risk for osteoarthritis, sleep apnea, and other respiratory problems  Aim for a slow, steady weight loss  Even a small amount of weight loss can lower your risk of health problems  How to lose weight safely:  A safe and healthy way to lose weight is to eat fewer calories and get regular exercise  You can lose up about 1 pound a week by decreasing the number of calories you eat by 500 calories each day  Healthy meal plan for weight management:  A healthy meal plan includes a variety of foods, contains fewer calories, and helps you stay healthy  A healthy meal plan includes the following:  · Eat whole-grain foods more often  A healthy meal plan should contain fiber  Fiber is the part of grains, fruits, and vegetables that is not broken down by your body  Whole-grain foods are healthy and provide extra fiber in your diet  Some examples of whole-grain foods are whole-wheat breads and pastas, oatmeal, brown rice, and bulgur  · Eat a variety of vegetables every day  Include dark, leafy greens such as spinach, kale, hitesh greens, and mustard greens  Eat yellow and orange vegetables such as carrots, sweet potatoes, and winter squash  · Eat a variety of fruits every day  Choose fresh or canned fruit (canned in its own juice or light syrup) instead of juice  Fruit juice has very little or no fiber  · Eat low-fat dairy foods  Drink fat-free (skim) milk or 1% milk  Eat fat-free yogurt and low-fat cottage cheese  Try low-fat cheeses such as mozzarella and other reduced-fat cheeses  · Choose meat and other protein foods that are low in fat  Choose beans or other legumes such as split peas or lentils  Choose fish, skinless poultry (chicken or turkey), or lean cuts of red meat (beef or pork)  Before you cook meat or poultry, cut off any visible fat  · Use less fat and oil  Try baking foods instead of frying them  Add less fat, such as margarine, sour cream, regular salad dressing and mayonnaise to foods  Eat fewer high-fat foods  Some examples of high-fat foods include french fries, doughnuts, ice cream, and cakes  · Eat fewer sweets  Limit foods and drinks that are high in sugar  This includes candy, cookies, regular soda, and sweetened drinks  Exercise:  Exercise at least 30 minutes per day on most days of the week  Some examples of exercise include walking, biking, dancing, and swimming  You can also fit in more physical activity by taking the stairs instead of the elevator or parking farther away from stores  Ask your healthcare provider about the best exercise plan for you  © Copyright [employer] 2018 Information is for End User's use only and may not be sold, redistributed or otherwise used for commercial purposes   All illustrations and images included in CareNotes® are the copyrighted property of [employer]  or [address] [place] [address]

## 2021-08-03 NOTE — PROGRESS NOTES
Assessment/Plan:    Other hyperlipidemia    A follow-up assessment of lipid profile has been requested for this patient recommend she continue with a low-cholesterol diet and we will review the lipid profile with patient when it has been completed  Essential hypertension    Assessment of the patient's blood pressure today indicates a hypertensive reading of 142/92  She also was recently evaluated in urgent care and was found to have a blood pressure on the 16th of July of 162/78  In view of these 2 elevated blood pressure readings I have recommended to the patient that she embark on treatment for her hypertension  Will start lisinopril 5 mg daily taken once a day in the morning I have recommended strongly that the patient embark on a program to reduce her weight which may have a significant benefit in controlling her blood pressure  A comprehensive metabolic profile, lipid profile, thyroid screening studies have all been requested and will be reviewed with the patient when she returns in 3 weeks to recheck her blood pressure on current therapy  Diagnoses and all orders for this visit:    Screening for hypothyroidism  -     TSH, 3rd generation with Free T4 reflex; Future    Essential hypertension  -     Comprehensive metabolic panel; Future  -     UA w Reflex to Microscopic w Reflex to Culture -Lab Collect  -     lisinopril (ZESTRIL) 5 mg tablet; Take 1 tablet (5 mg total) by mouth daily    Other hyperlipidemia  -     Lipid panel; Future    Screening for deficiency anemia  -     CBC and differential; Future    Colon cancer screening  -     Occult Blood, Fecal Immunochemical; Future    Other orders  -     lidocaine (XYLOCAINE) 1 %; 4 mL  -     triamcinolone acetonide (KENALOG-40) 40 mg/mL; 1 mL        Subjective:      Patient ID: Issa Leon is a 76 y o  female       This 50-year-old female patient presents today for an annual complete physical examination and   Has been provided with a request for annual blood work  This blood work will be reviewed with the patient upon its completion  Patient currently has no complaints  She denies any signs or symptoms of COVID-19 infection and has had no cardiac symptoms of chest pain palpitations or shortness of breath  The following portions of the patient's history were reviewed and updated as appropriate:   She  has a past medical history of Abnormal fasting glucose and Scoliosis  She   Patient Active Problem List    Diagnosis Date Noted    Essential hypertension 08/03/2021    Arthritis of knee 06/01/2020    Other hyperlipidemia 06/01/2020    Obesity (BMI 30-39 9) 11/06/2017    Mild tricuspid insufficiency 07/25/2016    Non-rheumatic mitral regurgitation 07/25/2016    Nonrheumatic pulmonary valve insufficiency 07/25/2016    RBBB (right bundle branch block) 07/25/2016     She  has a past surgical history that includes Hysterectomy  Her family history includes No Known Problems in her mother  She  reports that she has never smoked  She has never used smokeless tobacco  No history on file for alcohol use and drug use  Current Outpatient Medications   Medication Sig Dispense Refill    Ascorbic Acid (VITAMIN C) 100 MG tablet Take 100 mg by mouth daily      Cholecalciferol (VITAMIN D3) 2000 units capsule Vitamin D3      glucosamine-chondroitin 500-400 MG tablet Take 1 tablet by mouth 3 (three) times a day      lidocaine (XYLOCAINE) 1 % 4 mL      LIDOCAINE EX 4 mL      triamcinolone acetonide (KENALOG-40) 40 mg/mL 1 mL      Turmeric 500 MG TABS Take by mouth daily      lisinopril (ZESTRIL) 5 mg tablet Take 1 tablet (5 mg total) by mouth daily 30 tablet 1     No current facility-administered medications for this visit       Review of Systems   All other systems reviewed and are negative          Objective:      /92   Pulse 83   Temp 98 2 °F (36 8 °C)   Ht 5' 7" (1 702 m)   Wt 97 5 kg (215 lb)   SpO2 96%   BMI 33 67 kg/m² Physical Exam  Vitals reviewed  Constitutional:       General: She is not in acute distress  Appearance: Normal appearance  She is well-developed  She is not ill-appearing  HENT:      Head: Normocephalic  Right Ear: Hearing, tympanic membrane, ear canal and external ear normal       Left Ear: Hearing, tympanic membrane, ear canal and external ear normal       Nose: Nose normal  No mucosal edema  Mouth/Throat:      Mouth: Mucous membranes are moist       Pharynx: Oropharynx is clear  Uvula midline  Eyes:      General: Lids are normal  No scleral icterus  Right eye: No discharge  Left eye: No discharge  Extraocular Movements: Extraocular movements intact  Conjunctiva/sclera: Conjunctivae normal       Pupils: Pupils are equal, round, and reactive to light  Neck:      Thyroid: No thyromegaly  Vascular: No carotid bruit or JVD  Cardiovascular:      Rate and Rhythm: Normal rate and regular rhythm  Heart sounds: Normal heart sounds  No murmur heard  Pulmonary:      Effort: Pulmonary effort is normal  No respiratory distress  Breath sounds: Normal breath sounds  No wheezing, rhonchi or rales  Abdominal:      General: Bowel sounds are normal  There is no distension  Palpations: Abdomen is soft  There is no mass  Tenderness: There is no abdominal tenderness  There is no guarding  Musculoskeletal:         General: No swelling or tenderness  Normal range of motion  Cervical back: Normal range of motion and neck supple  No rigidity or tenderness  Right lower leg: No edema  Left lower leg: No edema  Lymphadenopathy:      Cervical: No cervical adenopathy  Skin:     General: Skin is warm and dry  Coloration: Skin is not jaundiced or pale  Neurological:      General: No focal deficit present  Mental Status: She is alert and oriented to person, place, and time  Mental status is at baseline        Deep Tendon Reflexes: Reflexes are normal and symmetric  Reflexes normal    Psychiatric:         Mood and Affect: Mood normal          Speech: Speech normal          Behavior: Behavior normal  Behavior is cooperative  Thought Content:  Thought content normal          Judgment: Judgment normal

## 2021-08-03 NOTE — PROGRESS NOTES
Assessment and Plan:     Problem List Items Addressed This Visit     None        BMI Counseling: Body mass index is 33 67 kg/m²  The BMI is above normal  Nutrition recommendations include decreasing portion sizes, encouraging healthy choices of fruits and vegetables, consuming healthier snacks and moderation in carbohydrate intake  Exercise recommendations include moderate physical activity 150 minutes/week  No pharmacotherapy was ordered  Preventive health issues were discussed with patient, and age appropriate screening tests were ordered as noted in patient's After Visit Summary  Personalized health advice and appropriate referrals for health education or preventive services given if needed, as noted in patient's After Visit Summary  History of Present Illness:     Patient presents for Medicare Annual Wellness visit    Patient Care Team:  Zhang Tuttle MD as PCP - General     Problem List:     Patient Active Problem List   Diagnosis    Mild tricuspid insufficiency    Non-rheumatic mitral regurgitation    Nonrheumatic pulmonary valve insufficiency    Obesity (BMI 30-39  9)    RBBB (right bundle branch block)    Arthritis of knee    Other hyperlipidemia    Preop exam for internal medicine      Past Medical and Surgical History:     Past Medical History:   Diagnosis Date    Abnormal fasting glucose     Scoliosis      Past Surgical History:   Procedure Laterality Date    HYSTERECTOMY        Family History:     Family History   Problem Relation Age of Onset    No Known Problems Mother       Social History:     Social History     Socioeconomic History    Marital status: /Civil Union     Spouse name: Not on file    Number of children: Not on file    Years of education: Not on file    Highest education level: Not on file   Occupational History    Not on file   Tobacco Use    Smoking status: Never Smoker    Smokeless tobacco: Never Used   Substance and Sexual Activity    Alcohol use: Not on file    Drug use: Not on file    Sexual activity: Not on file   Other Topics Concern    Not on file   Social History Narrative    Caffeine use    Exercises frequently     Social Determinants of Health     Financial Resource Strain:     Difficulty of Paying Living Expenses:    Food Insecurity:     Worried About Running Out of Food in the Last Year:     [address] in the Last Year:    Transportation Needs:     Lack of Transportation (Medical):  Lack of Transportation (Non-Medical):    Physical Activity:     Days of Exercise per Week:     Minutes of Exercise per Session:    Stress:     Feeling of Stress :    Social Connections:     Frequency of Communication with Friends and Family:     Frequency of Social Gatherings with Friends and Family:     Attends Religion Services:     Active Member of Clubs or Organizations:     Attends Club or Organization Meetings:     Marital Status:    Intimate Partner Violence:     Fear of Current or Ex-Partner:     Emotionally Abused:     Physically Abused:     Sexually Abused:       Medications and Allergies:     Current Outpatient Medications   Medication Sig Dispense Refill    Ascorbic Acid (VITAMIN C) 100 MG tablet Take 100 mg by mouth daily      Cholecalciferol (VITAMIN D3) 2000 units capsule Vitamin D3      glucosamine-chondroitin 500-400 MG tablet Take 1 tablet by mouth 3 (three) times a day      lidocaine (XYLOCAINE) 1 % 4 mL      LIDOCAINE EX 4 mL      triamcinolone acetonide (KENALOG-40) 40 mg/mL 1 mL      Turmeric 500 MG TABS Take by mouth daily       No current facility-administered medications for this visit       Allergies   Allergen Reactions    Meperidine     Oxycodone-Acetaminophen     Pollen Extract       Immunizations:     Immunization History   Administered Date(s) Administered    INFLUENZA 11/02/2018    Influenza, recombinant, quadrivalent,injectable, preservative free 11/02/2018    Tdap 12/07/2018 Health Maintenance:         Topic Date Due    Hepatitis C Screening  Never done    Breast Cancer Screening: Mammogram  11/14/2018    Colorectal Cancer Screening  05/27/2021         Topic Date Due    COVID-19 Vaccine (1) Never done    Pneumococcal Vaccine: 65+ Years (1 of 1 - PPSV23) Never done    Influenza Vaccine (1) 09/01/2021      Medicare Health Risk Assessment:     There were no vitals taken for this visit  Álvaro Gant is here for her Subsequent Wellness visit  Last Medicare Wellness visit information reviewed, patient interviewed, no change since last AWV  Health Risk Assessment:   Patient rates overall health as very good  Patient feels that their physical health rating is slightly better  Patient is very satisfied with their life  Eyesight was rated as same  Hearing was rated as same  Patient feels that their emotional and mental health rating is same  Patients states they are never, rarely angry  Patient states they are sometimes unusually tired/fatigued  Pain experienced in the last 7 days has been some  Patient's pain rating has been 3/10  Patient states that she has experienced no weight loss or gain in last 6 months  Depression Screening:   PHQ-2 Score: 0      Fall Risk Screening: In the past year, patient has experienced: no history of falling in past year      Urinary Incontinence Screening:   Patient has not leaked urine accidently in the last six months  Home Safety:  Patient does not have trouble with stairs inside or outside of their home  Patient has working smoke alarms and has working carbon monoxide detector  Home safety hazards include: none  Nutrition:   Current diet is Regular  Medications:   Patient is currently taking over-the-counter supplements  OTC medications include: see medication list  Patient is able to manage medications       Activities of Daily Living (ADLs)/Instrumental Activities of Daily Living (IADLs):   Walk and transfer into and out of bed and chair?: Yes  Dress and groom yourself?: Yes    Bathe or shower yourself?: Yes    Feed yourself?  Yes  Do your laundry/housekeeping?: Yes  Manage your money, pay your bills and track your expenses?: Yes  Make your own meals?: Yes    Do your own shopping?: Yes    Previous Hospitalizations:   Any hospitalizations or ED visits within the last 12 months?: Yes    How many hospitalizations have you had in the last year?: 1-2    Advance Care Planning:   Living will: No    Durable POA for healthcare: No    Advanced directive: No      PREVENTIVE SCREENINGS      Cardiovascular Screening:    General: Screening Not Indicated and History Lipid Disorder      Diabetes Screening:     General: Screening Current      Cervical Cancer Screening:    General: Screening Not Indicated      Lung Cancer Screening:     General: Screening Not Indicated    Screening, Brief Intervention, and Referral to Treatment (SBIRT)    Screening    Typical number of drinks in a week: 3    Single Item Drug Screening:  How often have you used an illegal drug (including marijuana) or a prescription medication for non-medical reasons in the past year? never    Single Item Drug Screen Score: 0  Interpretation: Negative screen for possible drug use disorder      Marc Clemente MD

## 2021-08-04 ENCOUNTER — APPOINTMENT (OUTPATIENT)
Dept: LAB | Age: 66
End: 2021-08-04
Payer: MEDICARE

## 2021-08-04 DIAGNOSIS — E78.49 OTHER HYPERLIPIDEMIA: ICD-10-CM

## 2021-08-04 DIAGNOSIS — Z12.11 COLON CANCER SCREENING: ICD-10-CM

## 2021-08-04 DIAGNOSIS — Z13.0 SCREENING FOR DEFICIENCY ANEMIA: ICD-10-CM

## 2021-08-04 DIAGNOSIS — Z13.29 SCREENING FOR HYPOTHYROIDISM: ICD-10-CM

## 2021-08-04 DIAGNOSIS — I10 ESSENTIAL HYPERTENSION: ICD-10-CM

## 2021-08-04 LAB
ALBUMIN SERPL BCP-MCNC: 3.6 G/DL (ref 3.5–5)
ALP SERPL-CCNC: 77 U/L (ref 46–116)
ALT SERPL W P-5'-P-CCNC: 24 U/L (ref 12–78)
ANION GAP SERPL CALCULATED.3IONS-SCNC: 6 MMOL/L (ref 4–13)
AST SERPL W P-5'-P-CCNC: 22 U/L (ref 5–45)
BACTERIA UR QL AUTO: ABNORMAL /HPF
BASOPHILS # BLD AUTO: 0.07 THOUSANDS/ΜL (ref 0–0.1)
BASOPHILS NFR BLD AUTO: 2 % (ref 0–1)
BILIRUB SERPL-MCNC: 0.49 MG/DL (ref 0.2–1)
BILIRUB UR QL STRIP: NEGATIVE
BUN SERPL-MCNC: 8 MG/DL (ref 5–25)
CALCIUM SERPL-MCNC: 8.9 MG/DL (ref 8.3–10.1)
CHLORIDE SERPL-SCNC: 109 MMOL/L (ref 100–108)
CHOLEST SERPL-MCNC: 171 MG/DL (ref 50–200)
CLARITY UR: ABNORMAL
CO2 SERPL-SCNC: 25 MMOL/L (ref 21–32)
COLOR UR: YELLOW
CREAT SERPL-MCNC: 0.52 MG/DL (ref 0.6–1.3)
EOSINOPHIL # BLD AUTO: 0.07 THOUSAND/ΜL (ref 0–0.61)
EOSINOPHIL NFR BLD AUTO: 2 % (ref 0–6)
ERYTHROCYTE [DISTWIDTH] IN BLOOD BY AUTOMATED COUNT: 16.5 % (ref 11.6–15.1)
GFR SERPL CREATININE-BSD FRML MDRD: 100 ML/MIN/1.73SQ M
GLUCOSE P FAST SERPL-MCNC: 101 MG/DL (ref 65–99)
GLUCOSE UR STRIP-MCNC: NEGATIVE MG/DL
HCT VFR BLD AUTO: 37.6 % (ref 34.8–46.1)
HDLC SERPL-MCNC: 72 MG/DL
HEMOCCULT STL QL IA: NEGATIVE
HGB BLD-MCNC: 11.7 G/DL (ref 11.5–15.4)
HGB UR QL STRIP.AUTO: NEGATIVE
HYALINE CASTS #/AREA URNS LPF: ABNORMAL /LPF
IMM GRANULOCYTES # BLD AUTO: 0.01 THOUSAND/UL (ref 0–0.2)
IMM GRANULOCYTES NFR BLD AUTO: 0 % (ref 0–2)
KETONES UR STRIP-MCNC: NEGATIVE MG/DL
LDLC SERPL CALC-MCNC: 88 MG/DL (ref 0–100)
LEUKOCYTE ESTERASE UR QL STRIP: ABNORMAL
LYMPHOCYTES # BLD AUTO: 1.04 THOUSANDS/ΜL (ref 0.6–4.47)
LYMPHOCYTES NFR BLD AUTO: 23 % (ref 14–44)
MCH RBC QN AUTO: 27.3 PG (ref 26.8–34.3)
MCHC RBC AUTO-ENTMCNC: 31.1 G/DL (ref 31.4–37.4)
MCV RBC AUTO: 88 FL (ref 82–98)
MONOCYTES # BLD AUTO: 0.46 THOUSAND/ΜL (ref 0.17–1.22)
MONOCYTES NFR BLD AUTO: 10 % (ref 4–12)
NEUTROPHILS # BLD AUTO: 2.83 THOUSANDS/ΜL (ref 1.85–7.62)
NEUTS SEG NFR BLD AUTO: 63 % (ref 43–75)
NITRITE UR QL STRIP: NEGATIVE
NON-SQ EPI CELLS URNS QL MICRO: ABNORMAL /HPF
NONHDLC SERPL-MCNC: 99 MG/DL
NRBC BLD AUTO-RTO: 0 /100 WBCS
PH UR STRIP.AUTO: 6.5 [PH]
PLATELET # BLD AUTO: 252 THOUSANDS/UL (ref 149–390)
PMV BLD AUTO: 12.1 FL (ref 8.9–12.7)
POTASSIUM SERPL-SCNC: 3.8 MMOL/L (ref 3.5–5.3)
PROT SERPL-MCNC: 7.4 G/DL (ref 6.4–8.2)
PROT UR STRIP-MCNC: NEGATIVE MG/DL
RBC # BLD AUTO: 4.29 MILLION/UL (ref 3.81–5.12)
RBC #/AREA URNS AUTO: ABNORMAL /HPF
SODIUM SERPL-SCNC: 140 MMOL/L (ref 136–145)
SP GR UR STRIP.AUTO: 1.01 (ref 1–1.03)
TRIGL SERPL-MCNC: 56 MG/DL
TSH SERPL DL<=0.05 MIU/L-ACNC: 2.82 UIU/ML (ref 0.36–3.74)
UROBILINOGEN UR QL STRIP.AUTO: 0.2 E.U./DL
WBC # BLD AUTO: 4.48 THOUSAND/UL (ref 4.31–10.16)
WBC #/AREA URNS AUTO: ABNORMAL /HPF

## 2021-08-04 PROCEDURE — 85025 COMPLETE CBC W/AUTO DIFF WBC: CPT

## 2021-08-04 PROCEDURE — 80053 COMPREHEN METABOLIC PANEL: CPT

## 2021-08-04 PROCEDURE — G0328 FECAL BLOOD SCRN IMMUNOASSAY: HCPCS

## 2021-08-04 PROCEDURE — 84443 ASSAY THYROID STIM HORMONE: CPT

## 2021-08-04 PROCEDURE — 80061 LIPID PANEL: CPT

## 2021-08-04 PROCEDURE — 87086 URINE CULTURE/COLONY COUNT: CPT | Performed by: INTERNAL MEDICINE

## 2021-08-04 PROCEDURE — 36415 COLL VENOUS BLD VENIPUNCTURE: CPT

## 2021-08-04 PROCEDURE — 81001 URINALYSIS AUTO W/SCOPE: CPT | Performed by: INTERNAL MEDICINE

## 2021-08-05 ENCOUNTER — TELEPHONE (OUTPATIENT)
Dept: INTERNAL MEDICINE CLINIC | Facility: CLINIC | Age: 66
End: 2021-08-05

## 2021-08-05 LAB — BACTERIA UR CULT: NORMAL

## 2021-08-05 NOTE — PROGRESS NOTES
Blood work was reviewed in detail she is starting the lisinopril for blood pressure control and will see her in the office for follow-up in 3 weeks

## 2021-08-05 NOTE — TELEPHONE ENCOUNTER
Pt would like to speak to you regarding her medication and her labs,She can be reached at [phone]

## 2021-08-10 ENCOUNTER — TELEPHONE (OUTPATIENT)
Dept: INTERNAL MEDICINE CLINIC | Facility: CLINIC | Age: 66
End: 2021-08-10

## 2021-08-10 DIAGNOSIS — R07.89 CHEST TIGHTNESS: Primary | ICD-10-CM

## 2021-08-10 NOTE — PROGRESS NOTES
Patient called today concerned she believes that her heart rate is running faster than normal   She also has been experiencing some tightness in her chest that occurs sometimes with the exercise but also at nighttime  She has a recent diagnosis of hypertension  Previous electrocardiogram shows a right bundle branch block but no ischemia  I recommended a cardiology consultation she has seen Dr Zhanna Leon in the past and will schedule her to see Dr Zhanna Leon again in addition will schedule a stress echocardiogram for the patient

## 2021-08-10 NOTE — TELEPHONE ENCOUNTER
Pt would like to speak with you sometime today in regards to tachycardia and slight intermittent chest pain  She can be reached back at [phone]      Thank you

## 2021-08-10 NOTE — TELEPHONE ENCOUNTER
Call was returned to the patient discussed her symptoms we have scheduled a cardiology consultation as well as stress test for this patient    Please see note on chart

## 2021-09-13 ENCOUNTER — APPOINTMENT (OUTPATIENT)
Dept: ORTHOPEDIC SURGERY | Facility: CLINIC | Age: 66
End: 2021-09-13
Payer: MEDICARE

## 2021-09-13 PROCEDURE — 73562 X-RAY EXAM OF KNEE 3: CPT | Mod: 50

## 2021-09-13 PROCEDURE — 99213 OFFICE O/P EST LOW 20 MIN: CPT

## 2021-09-13 RX ORDER — LISINOPRIL 5 MG/1
5 TABLET ORAL
Qty: 30 | Refills: 0 | Status: ACTIVE | COMMUNITY
Start: 2021-08-03

## 2021-09-13 NOTE — END OF VISIT
[FreeTextEntry3] : This note was written by Tawnya Larsen on 09/13/2021 acting as scribe for Dr. Lionel Duong M.D.\par \par I, Dr. Lionel Duong, have read and attest that all the information, medical decision making and discharge instructions within are true and accurate.

## 2021-09-13 NOTE — PHYSICAL EXAM
[de-identified] : LEFT Knee\par Inspection: no effusion or erythema.\par Wounds: curvy linear incisions\par Alignment: normal.\par Palpation: no specific tenderness on palpation.\par ROM: Active (in degrees): 0-115 No instability and good strength. \par Ligamentous laxity (neg): all ligaments appear stable, negative ant. drawer test, negative post. drawer test, stable to varus stress test, stable to valgus stress test, negative Lachman's test, negative pivot shift test,\par Meniscal Test: negative McMurrays, negative Maria Eugenia.\par Patellofemoral Alignment Test: Q angle-, normal.\par Muscle Test: good quad strength.\par Leg examination: calf is soft and non-tender. \par \par RIGHT Knee\par Inspection: no effusion\par Wounds: healed midline incision\par Alignment: normal.\par Palpation: no specific tenderness on palpation.\par ROM: Active (in degrees): 0-115 No instability and good strength. \par Ligamentous laxity (neg): negative ant. drawer test, negative post. drawer test, stable to varus stress test, stable to valgus stress test,\par Patellofemoral Alignment Test: Q angle-, normal.\par Muscle Test: good quad strength.\par Leg examination: calf is soft and non-tender.  [de-identified] : LEFT knee xray, AP, lateral, merchant view taken at the office today demonstrates a total knee replacement in satisfactory position, unsurfaced patella sits in central position and normal alignment. No evidence of loosening. \par  \par  \par RIGHT knee xrays, 3 views standing AP/Lateral, Merchant, and 45 degree PA standing view, taken at the office today demonstrates a total knee replacement unsurfaced patella sits in central position and in normal alignment, evidence of ACL reconstruction, staple noted in the medial aspect of the knee, fixation device in femoral condyle noted, and femoral tibial screws from prior ACL reconstruction noted.

## 2021-09-13 NOTE — DISCUSSION/SUMMARY
[de-identified] : Patient is s/p RIGHT TKR at 1 year. Patient is doing well. I have recommended that she continue home exercises. They can continue activities as tolerated. Patient may follow up with x-ray in 1 year.

## 2021-09-13 NOTE — HISTORY OF PRESENT ILLNESS
[de-identified] : 66 year old female presents for follow-up evaluation s/p RIGHT TKR at 1 year and LEFT TKR at 21 years. She is doing well overall and continues to do home exercises, stationary bike, stretching, and yoga. She tries to walk 1 mile everyday and plays golf and pickleball. During her last visit, she had some pain in the lateral aspect of the right knee after playing pickleball. Dr. Duong recommended that she wear a brace, however she did not follow this advice and continued to play. The pain improved on its own. She admits to pain and aching with overuse but does not need to take medications. Otherwise, patient is doing well.

## 2022-02-25 RX ORDER — AMOXICILLIN 500 MG/1
500 CAPSULE ORAL
Qty: 20 | Refills: 5 | Status: ACTIVE | COMMUNITY
Start: 2020-09-11 | End: 1900-01-01

## 2022-03-16 ENCOUNTER — ESTABLISHED COMPREHENSIVE EXAM (OUTPATIENT)
Dept: URBAN - METROPOLITAN AREA CLINIC 6 | Facility: CLINIC | Age: 67
End: 2022-03-16

## 2022-03-16 DIAGNOSIS — Z01.00: ICD-10-CM

## 2022-03-16 PROCEDURE — 92014 COMPRE OPH EXAM EST PT 1/>: CPT

## 2022-03-16 PROCEDURE — 92015 DETERMINE REFRACTIVE STATE: CPT

## 2022-03-16 ASSESSMENT — KERATOMETRY
OD_AXISANGLE2_DEGREES: 90
OS_K2POWER_DIOPTERS: 43.75
OS_K1POWER_DIOPTERS: 43.50
OD_AXISANGLE_DEGREES: 180
OD_K2POWER_DIOPTERS: 43.75
OS_AXISANGLE2_DEGREES: 131
OD_K1POWER_DIOPTERS: 43.75
OS_AXISANGLE_DEGREES: 041

## 2022-03-16 ASSESSMENT — TONOMETRY
OD_IOP_MMHG: 15
OS_IOP_MMHG: 16

## 2022-03-16 ASSESSMENT — VISUAL ACUITY
OD_CC: 20/30+2
OS_CC: J1

## 2022-07-29 ENCOUNTER — RA CDI HCC (OUTPATIENT)
Dept: OTHER | Facility: HOSPITAL | Age: 67
End: 2022-07-29

## 2022-07-29 NOTE — PROGRESS NOTES
[address]  coding opportunities       Chart reviewed, no opportunity found: CHART REVIEWED, NO OPPORTUNITY FOUND        Patients Insurance     Medicare Insurance: Medicare

## 2022-08-04 ENCOUNTER — PROBLEM (OUTPATIENT)
Dept: URBAN - METROPOLITAN AREA CLINIC 6 | Facility: CLINIC | Age: 67
End: 2022-08-04

## 2022-08-04 ENCOUNTER — OFFICE VISIT (OUTPATIENT)
Dept: INTERNAL MEDICINE CLINIC | Facility: CLINIC | Age: 67
End: 2022-08-04
Payer: MEDICARE

## 2022-08-04 VITALS
SYSTOLIC BLOOD PRESSURE: 132 MMHG | TEMPERATURE: 97 F | RESPIRATION RATE: 16 BRPM | HEART RATE: 67 BPM | OXYGEN SATURATION: 97 % | DIASTOLIC BLOOD PRESSURE: 80 MMHG | HEIGHT: 67 IN | WEIGHT: 184 LBS | BODY MASS INDEX: 28.88 KG/M2

## 2022-08-04 DIAGNOSIS — E78.49 OTHER HYPERLIPIDEMIA: ICD-10-CM

## 2022-08-04 DIAGNOSIS — Z13.1 SCREENING FOR DIABETES MELLITUS: ICD-10-CM

## 2022-08-04 DIAGNOSIS — I37.1 NONRHEUMATIC PULMONARY VALVE INSUFFICIENCY: ICD-10-CM

## 2022-08-04 DIAGNOSIS — Z12.11 COLON CANCER SCREENING: ICD-10-CM

## 2022-08-04 DIAGNOSIS — I07.1 MILD TRICUSPID INSUFFICIENCY: ICD-10-CM

## 2022-08-04 DIAGNOSIS — Z00.00 WELCOME TO MEDICARE PREVENTIVE VISIT: ICD-10-CM

## 2022-08-04 DIAGNOSIS — R39.9 UTI SYMPTOMS: ICD-10-CM

## 2022-08-04 DIAGNOSIS — H43.811: ICD-10-CM

## 2022-08-04 DIAGNOSIS — Z00.00 HEALTHCARE MAINTENANCE: ICD-10-CM

## 2022-08-04 DIAGNOSIS — Z13.0 SCREENING FOR DEFICIENCY ANEMIA: ICD-10-CM

## 2022-08-04 DIAGNOSIS — Z13.220 SCREENING FOR HYPERLIPIDEMIA: ICD-10-CM

## 2022-08-04 DIAGNOSIS — I34.0 NON-RHEUMATIC MITRAL REGURGITATION: Primary | ICD-10-CM

## 2022-08-04 PROBLEM — I10 ESSENTIAL HYPERTENSION: Status: RESOLVED | Noted: 2021-08-03 | Resolved: 2022-08-04

## 2022-08-04 PROCEDURE — 99215 OFFICE O/P EST HI 40 MIN: CPT | Performed by: INTERNAL MEDICINE

## 2022-08-04 PROCEDURE — 92250 FUNDUS PHOTOGRAPHY W/I&R: CPT

## 2022-08-04 PROCEDURE — G0438 PPPS, INITIAL VISIT: HCPCS | Performed by: INTERNAL MEDICINE

## 2022-08-04 PROCEDURE — 92014 COMPRE OPH EXAM EST PT 1/>: CPT

## 2022-08-04 ASSESSMENT — KERATOMETRY
OS_AXISANGLE2_DEGREES: 131
OS_K1POWER_DIOPTERS: 43.50
OD_K1POWER_DIOPTERS: 43.75
OS_K2POWER_DIOPTERS: 43.75
OD_AXISANGLE_DEGREES: 180
OD_K2POWER_DIOPTERS: 43.75
OS_AXISANGLE_DEGREES: 041
OD_AXISANGLE2_DEGREES: 90

## 2022-08-04 ASSESSMENT — TONOMETRY
OD_IOP_MMHG: 11
OS_IOP_MMHG: 10

## 2022-08-04 ASSESSMENT — VISUAL ACUITY
OU_CC: J1
OS_CC: 20/25
OD_CC: 20/30

## 2022-08-04 NOTE — PROGRESS NOTES
Assessment and Plan:     Problem List Items Addressed This Visit        Cardiovascular and Mediastinum    Mild tricuspid insufficiency     No change in murmur nor any symptoms associated with mild tricuspid insufficiency will continue surveillance consideration for follow-up echocardiogram in 1 year         Non-rheumatic mitral regurgitation     Patient remains asymptomatic with no change in heart murmur from mitral regurgitation  Maintain surveillance and consider follow-up echocardiogram in 1 year         Nonrheumatic pulmonary valve insufficiency     Patient remains asymptomatic with no change in heart murmur recommend consideration of follow-up echocardiogram in 1 year            Other    Other hyperlipidemia     A lipid profile has been requested to assess the patient's current lipid risk  Other Visit Diagnoses     Welcome to Medicare preventive visit    -  Primary    Screening for deficiency anemia        Relevant Orders    CBC and differential    Colon cancer screening        Relevant Orders    Occult Blood, Fecal Immunochemical    Screening for hyperlipidemia        Relevant Orders    Lipid panel    UTI symptoms        Relevant Orders    UA w Reflex to Microscopic w Reflex to Culture -Lab Collect    Screening for diabetes mellitus        Relevant Orders    Comprehensive metabolic panel        BMI Counseling: Body mass index is 28 82 kg/m²  The BMI is above normal  Nutrition recommendations include moderation in carbohydrate intake and reducing intake of cholesterol  Exercise recommendations include exercising 3-5 times per week  No pharmacotherapy was ordered  Rationale for BMI follow-up plan is due to patient being overweight or obese  Depression Screening and Follow-up Plan: Patient was screened for depression during today's encounter  They screened negative with a PHQ-2 score of 0        Preventive health issues were discussed with patient, and age appropriate screening tests were ordered as noted in patient's After Visit Summary  Personalized health advice and appropriate referrals for health education or preventive services given if needed, as noted in patient's After Visit Summary  History of Present Illness:     Patient presents for a Medicare Wellness Visit    This 58-year-old female patient presents today for an annual complete physical examination  In addition to today's examination we have provided the patient for request for comprehensive blood work to complement today's physical exam     The patient last year was found to have elevated blood pressure readings and she was started on medication of lisinopril 5 mg daily  She subsequently developed chest tightness and tightness and orthostatic lightheadedness prompting admission to [facility]   Evaluation at that time did not reveal any significant cardiovascular anemia  Nor any evidence to support arrhythmia of the heart  They took her off the lisinopril medication and she has felt well since that time  Patient Care Team:  Jim Carney MD as PCP - General     Review of Systems:     Review of Systems   Eyes:        Floaters in right visual field   All other systems reviewed and are negative  Problem List:     Patient Active Problem List   Diagnosis    Mild tricuspid insufficiency    Non-rheumatic mitral regurgitation    Nonrheumatic pulmonary valve insufficiency    Obesity (BMI 30-39  9)    RBBB (right bundle branch block)    Arthritis of knee    Other hyperlipidemia      Past Medical and Surgical History:     Past Medical History:   Diagnosis Date    Abnormal fasting glucose     Scoliosis      Past Surgical History:   Procedure Laterality Date    HYSTERECTOMY        Family History:     Family History   Problem Relation Age of Onset    No Known Problems Mother       Social History:     Social History     Socioeconomic History    Marital status: /Civil Union     Spouse name: Not on file    Number of children: Not on file    Years of education: Not on file    Highest education level: Not on file   Occupational History    Not on file   Tobacco Use    Smoking status: Never Smoker    Smokeless tobacco: Never Used   Substance and Sexual Activity    Alcohol use: Not on file    Drug use: Not on file    Sexual activity: Not on file   Other Topics Concern    Not on file   Social History Narrative    Caffeine use    Exercises frequently     Social Determinants of Health     Financial Resource Strain: Not on file   Food Insecurity: Not on file   Transportation Needs: Not on file   Physical Activity: Not on file   Stress: Not on file   Social Connections: Not on file   Intimate Partner Violence: Not on file   Housing Stability: Not on file      Medications and Allergies:     Current Outpatient Medications   Medication Sig Dispense Refill    glucosamine-chondroitin 500-400 MG tablet Take 1 tablet by mouth 3 (three) times a day      Ascorbic Acid (VITAMIN C) 100 MG tablet Take 100 mg by mouth daily (Patient not taking: Reported on 8/4/2022)      Cholecalciferol (VITAMIN D3) 2000 units capsule Vitamin D3 (Patient not taking: Reported on 8/4/2022)       No current facility-administered medications for this visit       Allergies   Allergen Reactions    Meperidine     Oxycodone-Acetaminophen     Pollen Extract       Immunizations:     Immunization History   Administered Date(s) Administered    INFLUENZA 11/02/2018    Influenza, recombinant, quadrivalent,injectable, preservative free 11/02/2018    Tdap 12/07/2018      Health Maintenance:         Topic Date Due    Hepatitis C Screening  Never done    Breast Cancer Screening: Mammogram  11/14/2018    Colorectal Cancer Screening  08/04/2022         Topic Date Due    COVID-19 Vaccine (1) Never done    Pneumococcal Vaccine: 65+ Years (1 - PCV) Never done    Influenza Vaccine (1) 09/01/2022      Medicare Screening Tests and Risk Assessments:         Health Risk Assessment:   Patient rates overall health as very good  Patient feels that their physical health rating is much better  Patient is very satisfied with their life  Eyesight was rated as slightly worse  Hearing was rated as same  Patient feels that their emotional and mental health rating is much better  Patients states they are never, rarely angry  Patient states they are sometimes unusually tired/fatigued  Pain experienced in the last 7 days has been none  Patient states that she has experienced no weight loss or gain in last 6 months  Depression Screening:   PHQ-2 Score: 0      Fall Risk Screening: In the past year, patient has experienced: no history of falling in past year      Urinary Incontinence Screening:   Patient has not leaked urine accidently in the last six months  Home Safety:  Patient does not have trouble with stairs inside or outside of their home  Patient has working smoke alarms and has working carbon monoxide detector  Home safety hazards include: none  Nutrition:   Current diet is Regular  Medications:   Patient is not currently taking any over-the-counter supplements  Patient is able to manage medications  Activities of Daily Living (ADLs)/Instrumental Activities of Daily Living (IADLs):   Walk and transfer into and out of bed and chair?: Yes  Dress and groom yourself?: Yes    Bathe or shower yourself?: Yes    Feed yourself?  Yes  Do your laundry/housekeeping?: Yes  Manage your money, pay your bills and track your expenses?: Yes  Make your own meals?: Yes    Do your own shopping?: Yes    Previous Hospitalizations:   Any hospitalizations or ED visits within the last 12 months?: Yes    How many hospitalizations have you had in the last year?: 1-2    Advance Care Planning:   Living will: Yes    Advanced directive: Yes      PREVENTIVE SCREENINGS      Cardiovascular Screening:    General: Screening Not Indicated and History Lipid Disorder Diabetes Screening:     General: Screening Current      Colorectal Cancer Screening:     General: Screening Current      Cervical Cancer Screening:    General: Screening Not Indicated      Osteoporosis Screening:    General: Screening Not Indicated      Abdominal Aortic Aneurysm (AAA) Screening:        General: Screening Not Indicated      Lung Cancer Screening:     General: Screening Not Indicated    Screening, Brief Intervention, and Referral to Treatment (SBIRT)    Screening  Typical number of drinks in a day: 0  Typical number of drinks in a week: 2  Interpretation: Low risk drinking behavior  Single Item Drug Screening:  How often have you used an illegal drug (including marijuana) or a prescription medication for non-medical reasons in the past year? never    Single Item Drug Screen Score: 0  Interpretation: Negative screen for possible drug use disorder    No exam data present     Physical Exam:     /80   Pulse 67   Temp (!) 97 °F (36 1 °C)   Resp 16   Ht 5' 7" (1 702 m)   Wt 83 5 kg (184 lb)   SpO2 97%   BMI 28 82 kg/m²     Physical Exam  Vitals and nursing note reviewed  Constitutional:       General: She is not in acute distress  Appearance: She is well-developed  She is not ill-appearing  HENT:      Head: Normocephalic and atraumatic  Right Ear: Tympanic membrane, ear canal and external ear normal       Left Ear: Tympanic membrane, ear canal and external ear normal       Nose: Nose normal       Mouth/Throat:      Mouth: Mucous membranes are moist       Pharynx: Oropharynx is clear  Eyes:      General: Scleral icterus present  Right eye: Discharge present  Left eye: Discharge present  Extraocular Movements: Extraocular movements intact  Conjunctiva/sclera: Conjunctivae normal       Pupils: Pupils are equal, round, and reactive to light  Neck:      Vascular: No carotid bruit  Cardiovascular:      Rate and Rhythm: Normal rate and regular rhythm  Heart sounds: Murmur heard  Pulmonary:      Effort: Pulmonary effort is normal  No respiratory distress  Breath sounds: Normal breath sounds  No wheezing, rhonchi or rales  Abdominal:      General: There is no distension  Palpations: Abdomen is soft  There is no mass  Tenderness: There is no abdominal tenderness  There is no guarding  Musculoskeletal:         General: No tenderness  Cervical back: Normal range of motion and neck supple  No rigidity or tenderness  Right lower leg: No edema  Left lower leg: No edema  Lymphadenopathy:      Cervical: No cervical adenopathy  Skin:     General: Skin is warm and dry  Coloration: Skin is not jaundiced or pale  Neurological:      Mental Status: She is alert  Mental status is at baseline  Psychiatric:         Mood and Affect: Mood normal          Behavior: Behavior normal          Thought Content:  Thought content normal          Judgment: Judgment normal           Neymar Cervantes MD

## 2022-08-04 NOTE — PATIENT INSTRUCTIONS
Medicare Preventive Visit Patient Instructions  Thank you for completing your Welcome to Medicare Visit or Medicare Annual Wellness Visit today  Your next wellness visit will be due in one year (8/5/2023)  The screening/preventive services that you may require over the next 5-10 years are detailed below  Some tests may not apply to you based off risk factors and/or age  Screening tests ordered at today's visit but not completed yet may show as past due  Also, please note that scanned in results may not display below  Preventive Screenings:  Service Recommendations Previous Testing/Comments   Colorectal Cancer Screening  * Colonoscopy    * Fecal Occult Blood Test (FOBT)/Fecal Immunochemical Test (FIT)  * Fecal DNA/Cologuard Test  * Flexible Sigmoidoscopy Age: 54-65 years old   Colonoscopy: every 10 years (may be performed more frequently if at higher risk)  OR  FOBT/FIT: every 1 year  OR  Cologuard: every 3 years  OR  Sigmoidoscopy: every 5 years  Screening may be recommended earlier than age 48 if at higher risk for colorectal cancer  Also, an individualized decision between you and your healthcare provider will decide whether screening between the ages of 74-80 would be appropriate  Colonoscopy: 08/04/2021  FOBT/FIT: 08/04/2021  Cologuard: Not on file  Sigmoidoscopy: Not on file    Screening Current     Breast Cancer Screening Age: 36 years old  Frequency: every 1-2 years  Not required if history of left and right mastectomy Mammogram: 11/14/2017        Cervical Cancer Screening Between the ages of 21-29, pap smear recommended once every 3 years  Between the ages of 33-67, can perform pap smear with HPV co-testing every 5 years     Recommendations may differ for women with a history of total hysterectomy, cervical cancer, or abnormal pap smears in past  Pap Smear: Not on file    Screening Not Indicated   Hepatitis C Screening Once for adults born between [facility]  More frequently in patients at high risk for Hepatitis C Hep C Antibody: Not on file        Diabetes Screening 1-2 times per year if you're at risk for diabetes or have pre-diabetes Fasting glucose: 101 mg/dL   A1C: 5 8 %    Screening Current   Cholesterol Screening Once every 5 years if you don't have a lipid disorder  May order more often based on risk factors  Lipid panel: 08/13/2021    Screening Not Indicated  History Lipid Disorder     Other Preventive Screenings Covered by Medicare:  1  Abdominal Aortic Aneurysm (AAA) Screening: covered once if your at risk  You're considered to be at risk if you have a family history of AAA  2  Lung Cancer Screening: covers low dose CT scan once per year if you meet all of the following conditions: (1) Age 50-69; (2) No signs or symptoms of lung cancer; (3) Current smoker or have quit smoking within the last 15 years; (4) You have a tobacco smoking history of at least 30 pack years (packs per day multiplied by number of years you smoked); (5) You get a written order from a healthcare provider  3  Glaucoma Screening: covered annually if you're considered high risk: (1) You have diabetes OR (2) Family history of glaucoma OR (3)  aged 48 and older OR (3)  American aged 72 and older  3  Osteoporosis Screening: covered every 2 years if you meet one of the following conditions: (1) You're estrogen deficient and at risk for osteoporosis based off medical history and other findings; (2) Have a vertebral abnormality; (3) On glucocorticoid therapy for more than 3 months; (4) Have primary hyperparathyroidism; (5) On osteoporosis medications and need to assess response to drug therapy  · Last bone density test (DXA Scan): Not on file  5  HIV Screening: covered annually if you're between the age of 12-76  Also covered annually if you are younger than 13 and older than 72 with risk factors for HIV infection   For pregnant patients, it is covered up to 3 times per pregnancy  Immunizations:  Immunization Recommendations   Influenza Vaccine Annual influenza vaccination during flu season is recommended for all persons aged >= 6 months who do not have contraindications   Pneumococcal Vaccine (Prevnar and Pneumovax)  * Prevnar = PCV13  * Pneumovax = PPSV23   Adults 25-60 years old: 1-3 doses may be recommended based on certain risk factors  Adults 72 years old: Prevnar (PCV13) vaccine recommended followed by Pneumovax (PPSV23) vaccine  If already received PPSV23 since turning 65, then PCV13 recommended at least one year after PPSV23 dose  Hepatitis B Vaccine 3 dose series if at intermediate or high risk (ex: diabetes, end stage renal disease, liver disease)   Tetanus (Td) Vaccine - COST NOT COVERED BY MEDICARE PART B Following completion of primary series, a booster dose should be given every 10 years to maintain immunity against tetanus  Td may also be given as tetanus wound prophylaxis  Tdap Vaccine - COST NOT COVERED BY MEDICARE PART B Recommended at least once for all adults  For pregnant patients, recommended with each pregnancy  Shingles Vaccine (Shingrix) - COST NOT COVERED BY MEDICARE PART B  2 shot series recommended in those aged 48 and above     Health Maintenance Due:      Topic Date Due    Hepatitis C Screening  Never done    Breast Cancer Screening: Mammogram  11/14/2018    Colorectal Cancer Screening  08/04/2022     Immunizations Due:      Topic Date Due    COVID-19 Vaccine (1) Never done    Pneumococcal Vaccine: 65+ Years (1 - PCV) Never done    Influenza Vaccine (1) 09/01/2022     Advance Directives   What are advance directives? Advance directives are legal documents that state your wishes and plans for medical care  These plans are made ahead of time in case you lose your ability to make decisions for yourself  Advance directives can apply to any medical decision, such as the treatments you want, and if you want to donate organs     What are the types of advance directives? There are many types of advance directives, and each state has rules about how to use them  You may choose a combination of any of the following:  · Living will: This is a written record of the treatment you want  You can also choose which treatments you do not want, which to limit, and which to stop at a certain time  This includes surgery, medicine, IV fluid, and tube feedings  · Durable power of  for healthcare [facility]): This is a written record that states who you want to make healthcare choices for you when you are unable to make them for yourself  This person, called a proxy, is usually a family member or a friend  You may choose more than 1 proxy  · Do not resuscitate (DNR) order:  A DNR order is used in case your heart stops beating or you stop breathing  It is a request not to have certain forms of treatment, such as CPR  A DNR order may be included in other types of advance directives  · Medical directive: This covers the care that you want if you are in a coma, near death, or unable to make decisions for yourself  You can list the treatments you want for each condition  Treatment may include pain medicine, surgery, blood transfusions, dialysis, IV or tube feedings, and a ventilator (breathing machine)  · Values history: This document has questions about your views, beliefs, and how you feel and think about life  This information can help others choose the care that you would choose  Why are advance directives important? An advance directive helps you control your care  Although spoken wishes may be used, it is better to have your wishes written down  Spoken wishes can be misunderstood, or not followed  Treatments may be given even if you do not want them  An advance directive may make it easier for your family to make difficult choices about your care     Weight Management   Why it is important to manage your weight:  Being overweight increases your risk of health conditions such as heart disease, high blood pressure, type 2 diabetes, and certain types of cancer  It can also increase your risk for osteoarthritis, sleep apnea, and other respiratory problems  Aim for a slow, steady weight loss  Even a small amount of weight loss can lower your risk of health problems  How to lose weight safely:  A safe and healthy way to lose weight is to eat fewer calories and get regular exercise  You can lose up about 1 pound a week by decreasing the number of calories you eat by 500 calories each day  Healthy meal plan for weight management:  A healthy meal plan includes a variety of foods, contains fewer calories, and helps you stay healthy  A healthy meal plan includes the following:  · Eat whole-grain foods more often  A healthy meal plan should contain fiber  Fiber is the part of grains, fruits, and vegetables that is not broken down by your body  Whole-grain foods are healthy and provide extra fiber in your diet  Some examples of whole-grain foods are whole-wheat breads and pastas, oatmeal, brown rice, and bulgur  · Eat a variety of vegetables every day  Include dark, leafy greens such as spinach, kale, hitesh greens, and mustard greens  Eat yellow and orange vegetables such as carrots, sweet potatoes, and winter squash  · Eat a variety of fruits every day  Choose fresh or canned fruit (canned in its own juice or light syrup) instead of juice  Fruit juice has very little or no fiber  · Eat low-fat dairy foods  Drink fat-free (skim) milk or 1% milk  Eat fat-free yogurt and low-fat cottage cheese  Try low-fat cheeses such as mozzarella and other reduced-fat cheeses  · Choose meat and other protein foods that are low in fat  Choose beans or other legumes such as split peas or lentils  Choose fish, skinless poultry (chicken or turkey), or lean cuts of red meat (beef or pork)  Before you cook meat or poultry, cut off any visible fat  · Use less fat and oil    Try baking foods instead of frying them  Add less fat, such as margarine, sour cream, regular salad dressing and mayonnaise to foods  Eat fewer high-fat foods  Some examples of high-fat foods include french fries, doughnuts, ice cream, and cakes  · Eat fewer sweets  Limit foods and drinks that are high in sugar  This includes candy, cookies, regular soda, and sweetened drinks  Exercise:  Exercise at least 30 minutes per day on most days of the week  Some examples of exercise include walking, biking, dancing, and swimming  You can also fit in more physical activity by taking the stairs instead of the elevator or parking farther away from stores  Ask your healthcare provider about the best exercise plan for you  © Copyright [employer] 2018 Information is for End User's use only and may not be sold, redistributed or otherwise used for commercial purposes   All illustrations and images included in CareNotes® are the copyrighted property of A D A M [employer]  or [address] [place] [address]

## 2022-08-04 NOTE — ASSESSMENT & PLAN NOTE
Patient remains asymptomatic with no change in heart murmur recommend consideration of follow-up echocardiogram in 1 year

## 2022-08-04 NOTE — ASSESSMENT & PLAN NOTE
No change in murmur nor any symptoms associated with mild tricuspid insufficiency will continue surveillance consideration for follow-up echocardiogram in 1 year

## 2022-08-04 NOTE — ASSESSMENT & PLAN NOTE
Patient remains asymptomatic with no change in heart murmur from mitral regurgitation    Maintain surveillance and consider follow-up echocardiogram in 1 year

## 2022-09-12 ENCOUNTER — APPOINTMENT (OUTPATIENT)
Dept: ORTHOPEDIC SURGERY | Facility: CLINIC | Age: 67
End: 2022-09-12

## 2022-09-12 DIAGNOSIS — M16.0 BILATERAL PRIMARY OSTEOARTHRITIS OF HIP: ICD-10-CM

## 2022-09-12 PROCEDURE — 99213 OFFICE O/P EST LOW 20 MIN: CPT

## 2022-09-12 PROCEDURE — 72170 X-RAY EXAM OF PELVIS: CPT

## 2022-09-12 PROCEDURE — 73562 X-RAY EXAM OF KNEE 3: CPT | Mod: 50

## 2022-09-12 NOTE — PHYSICAL EXAM
[de-identified] :      Left knee\par         Inspection: no effusion or erythema.  \par         Wounds: complex curviliner anterior incision\par         Alignment: normal.  \par         Palpation: no specific tenderness on palpation.  \par         ROM active (in degrees):,0-110\par         Ligamentous laxity: all ligaments appear stable, negative Lachman's test, negative ant. drawer test, negative post. drawer test, stable to varus stress test, stable to valgus stress test.  \par         Patellofemoral Alignment Test: Q angle-, normal.  \par         Muscle Test: good quad strength.  \par \par      Right knee\par         Inspection: no effusion or erythema.  \par         Wounds: healed midline incision \par         Alignment: normal.  \par         Palpation: 0-110\par         ROM active (in degrees):, .  0-110\par         Ligamentous laxity : all ligaments appear stable, negative Lachman's test, negative ant. drawer test, negative post. drawer test,  stable to varus stress test, stable to valgus stress test.  \par    \par         Patellofermoral Alignment Test: Q-angle-, normal.  \par         Muscle Test: good quad strength.  \par \par      Left hip\par         Inspection: No swelling or ecchymosis.  \par         Wounds: none.  \par         Palpation: non-tender.  \par         Stability: no instability.  \par         Strength: 5/5 all motor groups.  \par         ROM: FROM with no pain \par         Leg length: equal.  \par \par      Right hip\par         Inspection: No swelling or ecchymosis.  \par         Wounds: none.  \par         Palpation: non-tender.  \par         Stability: no instability.  \par         Strength: 5/5 all motor groups.  \par         ROM: no pain with FROM.  \par         Leg length: equal.  \par  [de-identified] : Radiographs taken in the office today\par Right knee 3 views, AP, lateral and Merchant views show a TKA in good position and alignment, no loosening and patella appropriate height and central position, femoral and tibial hardware form old ACL reconstruction\par \par Left knee 3 views, AP, lateral and Merchant views show a TKA in good position and alignment, no loosening and patella appropriate height and central position,

## 2022-09-12 NOTE — DISCUSSION/SUMMARY
[de-identified] : She is doing well following bilatral TKA , reviewed xrays and no change from prior films, has an occasional tendinitis after recreational activities but quickly resolves and no interfering with daily activity\par The referred pain in the right hip is related to mild arthritis  no need for surgical intervention and I recommended a home exercise program and Advil or Aleve as needed. If symptoms worsen will refer to Dr Feldman \par \par Followup in 1 year with xrays\par \par All questions asked and answered

## 2022-09-12 NOTE — HISTORY OF PRESENT ILLNESS
[de-identified] : Patient is a 67 year old female who presents for follow-up evaluation s/p left TKR x 23 years and s/p right TKR x 2 years. Patient states the left knee is doing well. She has been having some soreness of the medial aspect for the past couple of days. She states she was playing pickle ball 2 days ago after not having played in a while which she states may have contributed to the soreness. In regards to the right knee, patient states about 4-6 months ago she developed pain radiating from the right knee up the thigh and to the right groin. Patient states she has had 3 instances of this since then. She states she took Aleve as needed, performed her own home exercise with stretching and the pain resolved within 1-2 days.

## 2023-10-02 ENCOUNTER — APPOINTMENT (OUTPATIENT)
Dept: ORTHOPEDIC SURGERY | Facility: CLINIC | Age: 68
End: 2023-10-02
Payer: MEDICARE

## 2023-10-02 DIAGNOSIS — Z96.653 PRESENCE OF ARTIFICIAL KNEE JOINT, BILATERAL: ICD-10-CM

## 2023-10-02 DIAGNOSIS — M17.0 BILATERAL PRIMARY OSTEOARTHRITIS OF KNEE: ICD-10-CM

## 2023-10-02 PROCEDURE — 99213 OFFICE O/P EST LOW 20 MIN: CPT

## 2023-10-02 PROCEDURE — 73562 X-RAY EXAM OF KNEE 3: CPT | Mod: 50

## 2024-10-07 ENCOUNTER — APPOINTMENT (OUTPATIENT)
Dept: ORTHOPEDIC SURGERY | Facility: CLINIC | Age: 69
End: 2024-10-07

## 2024-12-25 PROBLEM — F10.90 ALCOHOL USE: Status: ACTIVE | Noted: 2020-01-27

## 2025-09-08 ENCOUNTER — NON-APPOINTMENT (OUTPATIENT)
Age: 70
End: 2025-09-08

## 2025-09-10 ENCOUNTER — APPOINTMENT (OUTPATIENT)
Dept: ORTHOPEDIC SURGERY | Facility: CLINIC | Age: 70
End: 2025-09-10
Payer: MEDICARE

## 2025-09-10 VITALS — HEIGHT: 67 IN | WEIGHT: 185 LBS | BODY MASS INDEX: 29.03 KG/M2

## 2025-09-10 DIAGNOSIS — M17.0 BILATERAL PRIMARY OSTEOARTHRITIS OF KNEE: ICD-10-CM

## 2025-09-10 DIAGNOSIS — Z96.653 PRESENCE OF ARTIFICIAL KNEE JOINT, BILATERAL: ICD-10-CM

## 2025-09-10 PROCEDURE — 99213 OFFICE O/P EST LOW 20 MIN: CPT

## 2025-09-10 PROCEDURE — 73562 X-RAY EXAM OF KNEE 3: CPT | Mod: 50

## 2025-09-10 PROCEDURE — G2211 COMPLEX E/M VISIT ADD ON: CPT

## 2025-09-19 ENCOUNTER — RESULT REVIEW (OUTPATIENT)
Age: 70
End: 2025-09-19

## 2025-09-19 ENCOUNTER — APPOINTMENT (OUTPATIENT)
Dept: CT IMAGING | Facility: CLINIC | Age: 70
End: 2025-09-19
Payer: MEDICARE

## 2025-09-19 PROCEDURE — 76376 3D RENDER W/INTRP POSTPROCES: CPT | Mod: 26

## 2025-09-19 PROCEDURE — 73700 CT LOWER EXTREMITY W/O DYE: CPT | Mod: 26,LT
